# Patient Record
Sex: MALE | Race: WHITE | NOT HISPANIC OR LATINO | Employment: STUDENT | URBAN - METROPOLITAN AREA
[De-identification: names, ages, dates, MRNs, and addresses within clinical notes are randomized per-mention and may not be internally consistent; named-entity substitution may affect disease eponyms.]

---
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
## 2021-03-19 ENCOUNTER — TELEPHONE (OUTPATIENT)
Dept: PEDIATRICS CLINIC | Age: 21
End: 2021-03-19

## 2021-03-19 DIAGNOSIS — Z20.822 CLOSE EXPOSURE TO COVID-19 VIRUS: Primary | ICD-10-CM

## 2021-03-28 ENCOUNTER — IMMUNIZATIONS (OUTPATIENT)
Dept: FAMILY MEDICINE CLINIC | Facility: HOSPITAL | Age: 21
End: 2021-03-28

## 2021-03-28 DIAGNOSIS — Z23 ENCOUNTER FOR IMMUNIZATION: Primary | ICD-10-CM

## 2021-03-28 PROCEDURE — 91300 SARS-COV-2 / COVID-19 MRNA VACCINE (PFIZER-BIONTECH) 30 MCG: CPT

## 2021-03-28 PROCEDURE — 0001A SARS-COV-2 / COVID-19 MRNA VACCINE (PFIZER-BIONTECH) 30 MCG: CPT

## 2021-04-10 ENCOUNTER — TELEPHONE (OUTPATIENT)
Dept: OTHER | Facility: OTHER | Age: 21
End: 2021-04-10

## 2021-04-27 ENCOUNTER — IMMUNIZATIONS (OUTPATIENT)
Dept: FAMILY MEDICINE CLINIC | Facility: HOSPITAL | Age: 21
End: 2021-04-27

## 2021-04-27 DIAGNOSIS — Z23 ENCOUNTER FOR IMMUNIZATION: Primary | ICD-10-CM

## 2021-04-27 PROCEDURE — 0002A SARS-COV-2 / COVID-19 MRNA VACCINE (PFIZER-BIONTECH) 30 MCG: CPT

## 2021-04-27 PROCEDURE — 91300 SARS-COV-2 / COVID-19 MRNA VACCINE (PFIZER-BIONTECH) 30 MCG: CPT

## 2021-08-13 ENCOUNTER — TELEPHONE (OUTPATIENT)
Dept: FAMILY MEDICINE CLINIC | Facility: CLINIC | Age: 21
End: 2021-08-13

## 2021-08-13 NOTE — TELEPHONE ENCOUNTER
Attempted to reach patient again, when he calls back, please offer a later appt time     Miya Hou MA

## 2021-08-13 NOTE — TELEPHONE ENCOUNTER
Called patient to change appt time as Vernadine Mustache will not be here that morning  LMOM for a call back     Jerry Arellano MA

## 2021-08-16 ENCOUNTER — OFFICE VISIT (OUTPATIENT)
Dept: FAMILY MEDICINE CLINIC | Facility: CLINIC | Age: 21
End: 2021-08-16
Payer: COMMERCIAL

## 2021-08-16 VITALS
OXYGEN SATURATION: 98 % | HEART RATE: 70 BPM | WEIGHT: 147 LBS | BODY MASS INDEX: 21.77 KG/M2 | SYSTOLIC BLOOD PRESSURE: 100 MMHG | HEIGHT: 69 IN | DIASTOLIC BLOOD PRESSURE: 60 MMHG | RESPIRATION RATE: 20 BRPM | TEMPERATURE: 96.6 F

## 2021-08-16 DIAGNOSIS — Z13.0 SCREENING FOR DEFICIENCY ANEMIA: ICD-10-CM

## 2021-08-16 DIAGNOSIS — R19.4 CHANGE IN BOWEL HABITS: ICD-10-CM

## 2021-08-16 DIAGNOSIS — Z23 NEED FOR VACCINATION: ICD-10-CM

## 2021-08-16 DIAGNOSIS — Z13.1 SCREENING FOR DIABETES MELLITUS: ICD-10-CM

## 2021-08-16 DIAGNOSIS — Z11.4 SCREENING FOR HIV (HUMAN IMMUNODEFICIENCY VIRUS): ICD-10-CM

## 2021-08-16 DIAGNOSIS — Z13.6 SCREENING FOR CARDIOVASCULAR CONDITION: ICD-10-CM

## 2021-08-16 DIAGNOSIS — Z00.00 ROUTINE ADULT HEALTH MAINTENANCE: Primary | ICD-10-CM

## 2021-08-16 DIAGNOSIS — Z11.59 NEED FOR HEPATITIS C SCREENING TEST: ICD-10-CM

## 2021-08-16 PROCEDURE — 1036F TOBACCO NON-USER: CPT | Performed by: NURSE PRACTITIONER

## 2021-08-16 PROCEDURE — 99385 PREV VISIT NEW AGE 18-39: CPT | Performed by: NURSE PRACTITIONER

## 2021-08-16 PROCEDURE — 90715 TDAP VACCINE 7 YRS/> IM: CPT

## 2021-08-16 PROCEDURE — 90471 IMMUNIZATION ADMIN: CPT

## 2021-08-16 PROCEDURE — 3725F SCREEN DEPRESSION PERFORMED: CPT | Performed by: NURSE PRACTITIONER

## 2021-08-16 PROCEDURE — 3008F BODY MASS INDEX DOCD: CPT | Performed by: NURSE PRACTITIONER

## 2021-08-16 NOTE — PATIENT INSTRUCTIONS
Wellness Visit for Adults   WHAT YOU NEED TO KNOW:   What is a wellness visit? A wellness visit is when you see your healthcare provider to get screened for health problems  Your healthcare provider will also give you advice on how to stay healthy  Write down your questions so you remember to ask them  Ask your healthcare provider how often you should have a wellness visit  What happens at a wellness visit? Your healthcare provider will ask about your health, and your family history of health problems  This includes high blood pressure, heart disease, and cancer  He or she will ask if you have symptoms that concern you, if you smoke, and about your mood  You may also be asked about your intake of medicines, supplements, food, and alcohol  Any of the following may be done:  · Your weight  will be checked  Your height may also be checked so your body mass index (BMI) can be calculated  Your BMI shows if you are at a healthy weight  · Your blood pressure  and heart rate will be checked  Your temperature may also be checked  · Blood and urine tests  may be done  Blood tests may be done to check your cholesterol levels  Abnormal cholesterol levels increase your risk for heart disease and stroke  You may also need a blood or urine test to check for diabetes if you are at increased risk  Urine tests may be done to look for signs of an infection or kidney disease  · A physical exam  includes checking your heartbeat and lungs with a stethoscope  Your healthcare provider may also check your skin to look for sun damage  · Screening tests  may be recommended  A screening test is done to check for diseases that may not cause symptoms  The screening tests you may need depend on your age, gender, family history, and lifestyle habits  For example, colorectal screening may be recommended if you are 48years old or older  What screening tests do I need if I am a woman?    · A Pap smear  is used to screen for cervical cancer  Pap smears are usually done every 3 to 5 years depending on your age  You may need them more often if you have had abnormal Pap smear test results in the past  Ask your healthcare provider how often you should have a Pap smear  · A mammogram  is an x-ray of your breasts to screen for breast cancer  Experts recommend mammograms every 2 years starting at age 48 years  You may need a mammogram at age 52 years or younger if you have an increased risk for breast cancer  Talk to your healthcare provider about when you should start having mammograms and how often you need them  What vaccines might I need? · Get an influenza vaccine  every year  The influenza vaccine protects you from the flu  Several types of viruses cause the flu  The viruses change over time, so new vaccines are made each year  · Get a tetanus-diphtheria (Td) booster vaccine  every 10 years  This vaccine protects you against tetanus and diphtheria  Tetanus is a severe infection that may cause painful muscle spasms and lockjaw  Diphtheria is a severe bacterial infection that causes a thick covering in the back of your mouth and throat  · Get a human papillomavirus (HPV) vaccine  if you are female and aged 23 to 32 or male 23 to 24 and never received it  This vaccine protects you from HPV infection  HPV is the most common infection spread by sexual contact  HPV may also cause vaginal, penile, and anal cancers  · Get a pneumococcal vaccine  if you are aged 72 years or older  The pneumococcal vaccine is an injection given to protect you from pneumococcal disease  Pneumococcal disease is an infection caused by pneumococcal bacteria  The infection may cause pneumonia, meningitis, or an ear infection  · Get a shingles vaccine  if you are 60 or older, even if you have had shingles before  The shingles vaccine is an injection to protect you from the varicella-zoster virus  This is the same virus that causes chickenpox   Shingles is a painful rash that develops in people who had chickenpox or have been exposed to the virus  How can I eat healthy? My Plate is a model for planning healthy meals  It shows the types and amounts of foods that should go on your plate  Fruits and vegetables make up about half of your plate, and grains and protein make up the other half  A serving of dairy is included on the side of your plate  The amount of calories and serving sizes you need depends on your age, gender, weight, and height  Examples of healthy foods are listed below:  · Eat a variety of vegetables  such as dark green, red, and orange vegetables  You can also include canned vegetables low in sodium (salt) and frozen vegetables without added butter or sauces  · Eat a variety of fresh fruits , canned fruit in 100% juice, frozen fruit, and dried fruit  · Include whole grains  At least half of the grains you eat should be whole grains  Examples include whole-wheat bread, wheat pasta, brown rice, and whole-grain cereals such as oatmeal     · Eat a variety of protein foods such as seafood (fish and shellfish), lean meat, and poultry without skin (turkey and chicken)  Examples of lean meats include pork leg, shoulder, or tenderloin, and beef round, sirloin, tenderloin, and extra lean ground beef  Other protein foods include eggs and egg substitutes, beans, peas, soy products, nuts, and seeds  · Choose low-fat dairy products such as skim or 1% milk or low-fat yogurt, cheese, and cottage cheese  · Limit unhealthy fats  such as butter, hard margarine, and shortening  How much exercise do I need? Exercise at least 30 minutes per day on most days of the week  Some examples of exercise include walking, biking, dancing, and swimming  You can also fit in more physical activity by taking the stairs instead of the elevator or parking farther away from stores  Include muscle strengthening activities 2 days each week   Regular exercise provides many health benefits  It helps you manage your weight, and decreases your risk for type 2 diabetes, heart disease, stroke, and high blood pressure  Exercise can also help improve your mood  Ask your healthcare provider about the best exercise plan for you  What are some general health and safety guidelines I should follow? · Do not smoke  Nicotine and other chemicals in cigarettes and cigars can cause lung damage  Ask your healthcare provider for information if you currently smoke and need help to quit  E-cigarettes or smokeless tobacco still contain nicotine  Talk to your healthcare provider before you use these products  · Limit alcohol  A drink of alcohol is 12 ounces of beer, 5 ounces of wine, or 1½ ounces of liquor  · Lose weight, if needed  Being overweight increases your risk of certain health conditions  These include heart disease, high blood pressure, type 2 diabetes, and certain types of cancer  · Protect your skin  Do not sunbathe or use tanning beds  Use sunscreen with a SPF 15 or higher  Apply sunscreen at least 15 minutes before you go outside  Reapply sunscreen every 2 hours  Wear protective clothing, hats, and sunglasses when you are outside  · Drive safely  Always wear your seatbelt  Make sure everyone in your car wears a seatbelt  A seatbelt can save your life if you are in an accident  Do not use your cell phone when you are driving  This could distract you and cause an accident  Pull over if you need to make a call or send a text message  · Practice safe sex  Use latex condoms if are sexually active and have more than one partner  Your healthcare provider may recommend screening tests for sexually transmitted infections (STIs)  · Wear helmets, lifejackets, and protective gear  Always wear a helmet when you ride a bike or motorcycle, go skiing, or play sports that could cause a head injury  Wear protective equipment when you play sports   Wear a lifejacket when you are on a boat or doing water sports  CARE AGREEMENT:   You have the right to help plan your care  Learn about your health condition and how it may be treated  Discuss treatment options with your healthcare providers to decide what care you want to receive  You always have the right to refuse treatment  The above information is an  only  It is not intended as medical advice for individual conditions or treatments  Talk to your doctor, nurse or pharmacist before following any medical regimen to see if it is safe and effective for you  © Copyright [employer] 2021 Information is for End User's use only and may not be sold, redistributed or otherwise used for commercial purposes   All illustrations and images included in CareNotes® are the copyrighted property of [employer]  or [address] [place] [address]

## 2021-08-16 NOTE — PROGRESS NOTES
FAMILY PRACTICE HEALTH MAINTENANCE OFFICE VISIT  [facility] - [facility]    NAME: Solomon Woodson  AGE: 24 y o  SEX: male  : 2000     DATE: 2021    Assessment and Plan     1  Routine adult health maintenance    2  Need for vaccination  -     TDAP VACCINE GREATER THAN OR EQUAL TO 6YO IM    3  Need for hepatitis C screening test  -     Hepatitis C antibody; Future  -     Hepatitis C antibody    4  Screening for HIV (human immunodeficiency virus)  -     [facility]- Human Immunodeficiency Virus 1/2 Antigen / Antibody ( Fourth Generation) with Reflex Testing; Future    5  Screening for cardiovascular condition  -     Lipid Panel with Direct LDL reflex; Future  -     Lipid Panel with Direct LDL reflex    6  Screening for diabetes mellitus  -     Comprehensive metabolic panel; Future  -     Comprehensive metabolic panel    7  Screening for deficiency anemia  -     CBC; Future  -     CBC    8  Change in bowel habits  -     Ambulatory referral to Gastroenterology; Future  -     Celiac Disease Antibody Profile; Future  -     Celiac Disease Antibody Profile        · Patient Counseling:   · Nutrition: Stressed importance of a well balanced diet, moderation of sodium/saturated fat, caloric balance and sufficient intake of fiber  · Exercise: Stressed the importance of regular exercise with a goal of 150 minutes per week  · Dental Health: Discussed daily flossing and brushing and regular dental visits   · Sexuality: Discussed sexually transmitted infections, use of condoms and prevention of unintended pregnancy  · Alcohol Use:  Recommended moderation of alcohol intake  · Injury Prevention: Discussed Safety Belts, Safety Helmets, and Smoke Detectors    · Immunizations reviewed: See Orders  · Discussed benefits of:  Screening labs  BMI Counseling: Body mass index is 21 71 kg/m²  Discussed with patient's BMI with him    Return in about 1 year (around 2022) for Annual physical         Chief Complaint     Chief Complaint   Patient presents with    Physical Exam     [facility] Patient Visit       History of Present Illness     HPI  New to practice  Personal and family medical history reviewed  Well Adult Physical   Patient here for a comprehensive physical exam   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 but will be living outside campus in apartment  Had h/o asthma but have not needed any inhaler from last 4 years  Stated that from a month having loose stools on and off but no rectal bleeding and weight loss and getting better  Denies any family h/o colon cancer, IBS and Inflammatory bowel disease  Will check for celiac and informed that if not improving then should follow with gastro and referral provided  Stated that lost 6 pounds since nov 2020 and it is intentional  Stated that from couple of months, sometimes had hard time seeing in night and will follow with ophthalmologist soon and had h/o lazy eye  Accepted HIV and Hep C screening  Paternal grandfather had prostate cancer in later age    Diet and Physical Activity  Diet: well balanced diet  Exercise: frequently      Depression Screen  PHQ-9 Depression Screening    PHQ-9:   Frequency of the following problems over the past two weeks:      Little interest or pleasure in doing things: 0 - not at all  Feeling down, depressed, or hopeless: 0 - not at all  PHQ-2 Score: 0          General Health  Hearing: Normal:  bilateral  Vision: most recent eye exam >1 year  Dental: regular dental visits    Reproductive Health  Denies nocturia and Monthly self testicular exams recommended      The following portions of the patient's history were reviewed and updated as appropriate: allergies, current medications, past family history, past medical history, past social history, past surgical history and problem list     Review of Systems     Review of Systems   Constitutional: Negative  HENT: Negative      Eyes: Negative  Respiratory: Negative  Cardiovascular: Negative  Gastrointestinal: Negative for abdominal distention, abdominal pain, anal bleeding, blood in stool, constipation, nausea, rectal pain and vomiting  As noted in HPI'     Endocrine: Negative  Genitourinary: Negative  Musculoskeletal: Negative  Skin: Negative  Allergic/Immunologic: Negative  Neurological: Negative  Hematological: Negative  Psychiatric/Behavioral: Negative  Past Medical History     Past Medical History:   Diagnosis Date    Acne     Asthma     Asthma 8/12/2013    Difficulty walking     last assessed 6/30/2014    Migraine     last assessed 11/20/2013       Past Surgical History     Past Surgical History:   Procedure Laterality Date    CIRCUMCISION      WISDOM TOOTH EXTRACTION         Social History     Social History     Socioeconomic History    Marital status: Single     Spouse name: None    Number of children: None    Years of education: 8    Highest education level: None   Occupational History    None   Tobacco Use    Smoking status: Never Smoker    Smokeless tobacco: Never Used   Vaping Use    Vaping Use: Never used   Substance and Sexual Activity    Alcohol use: No    Drug use: No    Sexual activity: Yes     Partners: Female   Other Topics Concern    None   Social History Narrative    Immunization status: up to date and documented  12th grade     Pets in caregiver's home    Hx of sports activities:  Field hockey    Track and field cross country     Social Determinants of Health     Financial Resource Strain:     Difficulty of Paying Living Expenses:    Food Insecurity:     Worried About Running Out of Food in the Last Year:     [address] in the Last Year:    Transportation Needs:     Lack of Transportation (Medical):      Lack of Transportation (Non-Medical):    Physical Activity:     Days of Exercise per Week:     Minutes of Exercise per Session:    Stress:     Feeling of Stress :    Social Connections:     Frequency of Communication with Friends and Family:     Frequency of Social Gatherings with Friends and Family:     Attends Sikh Services:     Active Member of Clubs or Organizations:     Attends Club or Organization Meetings:     Marital Status:    Intimate Partner Violence:     Fear of Current or Ex-Partner:     Emotionally Abused:     Physically Abused:     Sexually Abused:        Family History     Family History   Problem Relation Age of Onset    Interstitial cystitis Mother         chronic    Other Mother         kidney stones    Hypothyroidism Mother    [facility] Migraines Mother     Psoriasis Mother     Other Father         calculus of kidney and ureter    Atrial fibrillation Maternal Grandmother     Hypertension Maternal Grandmother     Other Paternal Grandfather         calculus of kidney and ureter    Cancer Paternal Grandfather         prostate cancer       Current Medications     No current outpatient medications on file  Allergies     No Known Allergies    Objective     /60   Pulse 70   Temp (!) 96 6 °F (35 9 °C)   Resp 20   Ht 5' 9" (1 753 m)   Wt 66 7 kg (147 lb)   SpO2 98%   BMI 21 71 kg/m²      Physical Exam  Vitals reviewed  Constitutional:       Appearance: Normal appearance  He is well-developed  HENT:      Head: Normocephalic  Right Ear: Tympanic membrane, ear canal and external ear normal       Left Ear: Tympanic membrane, ear canal and external ear normal       Nose: Nose normal       Right Sinus: No maxillary sinus tenderness or frontal sinus tenderness  Left Sinus: No maxillary sinus tenderness or frontal sinus tenderness  Mouth/Throat:      Lips: Pink  Mouth: Mucous membranes are moist       Dentition: Normal dentition  No gingival swelling  Pharynx: No pharyngeal swelling, oropharyngeal exudate or posterior oropharyngeal erythema        Comments: Patient has bilateral enlarged tonsils chronically  Eyes:      General: Lids are normal       Conjunctiva/sclera: Conjunctivae normal       Pupils: Pupils are equal, round, and reactive to light  Neck:      Thyroid: No thyromegaly  Vascular: No carotid bruit  Cardiovascular:      Rate and Rhythm: Normal rate and regular rhythm  Pulses:           Radial pulses are 2+ on the right side and 2+ on the left side  Femoral pulses are 2+ on the right side and 2+ on the left side  Dorsalis pedis pulses are 2+ on the right side and 2+ on the left side  Posterior tibial pulses are 2+ on the right side and 2+ on the left side  Heart sounds: Normal heart sounds  No murmur heard  Pulmonary:      Effort: Pulmonary effort is normal       Breath sounds: Normal breath sounds  Chest:      Chest wall: No mass, lacerations, deformity, swelling, tenderness, crepitus or edema  There is no dullness to percussion  Breasts:         Right: No swelling, bleeding, mass, nipple discharge, skin change or tenderness  Left: No swelling, bleeding, mass, nipple discharge, skin change or tenderness  Abdominal:      General: Bowel sounds are normal       Palpations: Abdomen is soft  There is no hepatomegaly  Tenderness: There is no abdominal tenderness  There is no rebound  Hernia: No hernia is present  There is no hernia in the umbilical area, ventral area, left inguinal area or right inguinal area  Musculoskeletal:         General: No tenderness or deformity  Normal range of motion  Cervical back: Full passive range of motion without pain, normal range of motion and neck supple  Lymphadenopathy:      Cervical:      Right cervical: No superficial or posterior cervical adenopathy  Left cervical: No superficial or posterior cervical adenopathy  Upper Body:      Right upper body: No supraclavicular or axillary adenopathy  Left upper body: No supraclavicular or axillary adenopathy        Lower Body: No right inguinal adenopathy  No left inguinal adenopathy  Skin:     General: Skin is warm and dry  Neurological:      Mental Status: He is alert and oriented to person, place, and time  GCS: GCS eye subscore is 4  GCS verbal subscore is 5  GCS motor subscore is 6  Sensory: No sensory deficit  Coordination: Coordination normal       Gait: Gait normal       Deep Tendon Reflexes: Reflexes are normal and symmetric  Psychiatric:         Speech: Speech normal          Behavior: Behavior normal          Thought Content:  Thought content normal          Judgment: Judgment normal             Visual Acuity Screening    Right eye Left eye Both eyes   Without correction: 20/20 20/20 20/20   With correction:              Radha Bell, [address] [place] [address]

## 2021-08-18 LAB
ALBUMIN SERPL-MCNC: 4.5 G/DL (ref 4.1–5.2)
ALBUMIN/GLOB SERPL: 1.7 {RATIO} (ref 1.2–2.2)
ALP SERPL-CCNC: 50 IU/L (ref 55–125)
ALT SERPL-CCNC: 16 IU/L (ref 0–44)
AST SERPL-CCNC: 21 IU/L (ref 0–40)
BILIRUB SERPL-MCNC: 1.6 MG/DL (ref 0–1.2)
BUN SERPL-MCNC: 14 MG/DL (ref 6–20)
BUN/CREAT SERPL: 19 (ref 9–20)
CALCIUM SERPL-MCNC: 9.3 MG/DL (ref 8.7–10.2)
CHLORIDE SERPL-SCNC: 100 MMOL/L (ref 96–106)
CHOLEST SERPL-MCNC: 144 MG/DL (ref 100–169)
CO2 SERPL-SCNC: 25 MMOL/L (ref 20–29)
CREAT SERPL-MCNC: 0.73 MG/DL (ref 0.76–1.27)
ENDOMYSIUM IGA SER QL: NEGATIVE
ERYTHROCYTE [DISTWIDTH] IN BLOOD BY AUTOMATED COUNT: 12.3 % (ref 11.6–15.4)
GLIADIN PEPTIDE IGA SER-ACNC: 3 UNITS (ref 0–19)
GLIADIN PEPTIDE IGG SER-ACNC: 2 UNITS (ref 0–19)
GLOBULIN SER-MCNC: 2.6 G/DL (ref 1.5–4.5)
GLUCOSE SERPL-MCNC: 88 MG/DL (ref 65–99)
HCT VFR BLD AUTO: 44.9 % (ref 37.5–51)
HCV AB S/CO SERPL IA: <0.1 S/CO RATIO (ref 0–0.9)
HDLC SERPL-MCNC: 50 MG/DL
HGB BLD-MCNC: 15.6 G/DL (ref 13–17.7)
HIV 1+2 AB+HIV1 P24 AG SERPL QL IA: NON REACTIVE
IGA SERPL-MCNC: 304 MG/DL (ref 90–386)
LDLC SERPL CALC-MCNC: 79 MG/DL (ref 0–109)
LDLC/HDLC SERPL: 1.6 RATIO (ref 0–3.6)
MCH RBC QN AUTO: 31.5 PG (ref 26.6–33)
MCHC RBC AUTO-ENTMCNC: 34.7 G/DL (ref 31.5–35.7)
MCV RBC AUTO: 91 FL (ref 79–97)
MICRODELETION SYND BLD/T FISH: NORMAL
PLATELET # BLD AUTO: 184 X10E3/UL (ref 150–450)
POTASSIUM SERPL-SCNC: 4.5 MMOL/L (ref 3.5–5.2)
PROT SERPL-MCNC: 7.1 G/DL (ref 6–8.5)
RBC # BLD AUTO: 4.95 X10E6/UL (ref 4.14–5.8)
SL AMB EGFR AFRICAN AMERICAN: 155 ML/MIN/1.73
SL AMB EGFR NON AFRICAN AMERICAN: 134 ML/MIN/1.73
SL AMB VLDL CHOLESTEROL CALC: 15 MG/DL (ref 5–40)
SODIUM SERPL-SCNC: 138 MMOL/L (ref 134–144)
TRIGL SERPL-MCNC: 80 MG/DL (ref 0–89)
TTG IGA SER-ACNC: <2 U/ML (ref 0–3)
TTG IGG SER-ACNC: 9 U/ML (ref 0–5)
WBC # BLD AUTO: 5 X10E3/UL (ref 3.4–10.8)

## 2022-02-05 ENCOUNTER — TELEMEDICINE (OUTPATIENT)
Dept: FAMILY MEDICINE CLINIC | Facility: CLINIC | Age: 22
End: 2022-02-05
Payer: COMMERCIAL

## 2022-02-05 DIAGNOSIS — H10.33 ACUTE BACTERIAL CONJUNCTIVITIS OF BOTH EYES: Primary | ICD-10-CM

## 2022-02-05 PROCEDURE — 99213 OFFICE O/P EST LOW 20 MIN: CPT | Performed by: FAMILY MEDICINE

## 2022-02-05 PROCEDURE — 1036F TOBACCO NON-USER: CPT | Performed by: FAMILY MEDICINE

## 2022-02-05 RX ORDER — MOXIFLOXACIN 5 MG/ML
1 SOLUTION/ DROPS OPHTHALMIC 3 TIMES DAILY
Qty: 3 ML | Refills: 0 | Status: SHIPPED | OUTPATIENT
Start: 2022-02-05 | End: 2022-02-12

## 2022-02-05 NOTE — PROGRESS NOTES
Virtual Regular Visit    Verification of patient location:    Patient is located in the following state in which I hold an active license [place]      Assessment/Plan:    Problem List Items Addressed This Visit     None      Visit Diagnoses     Acute bacterial conjunctivitis of both eyes    -  Primary    Relevant Medications    moxifloxacin (VIGAMOX) 0 5 % ophthalmic solution               Reason for visit is   Chief Complaint   Patient presents with    Virtual Regular Visit        Encounter provider Parish Eaton MD    Provider located at  O  [address] [id number]      Recent Visits  No visits were found meeting these conditions  Showing recent visits within past 7 days and meeting all other requirements  Today's Visits  Date Type Provider Dept   02/05/22 Telemedicine Parish Eaton, [facility] today's visits and meeting all other requirements  Future Appointments  No visits were found meeting these conditions  Showing future appointments within next 150 days and meeting all other requirements       The patient was identified by name and date of birth  Allison Lancaster was informed that this is a telemedicine visit and that the visit is being conducted through [facility] and patient was informed this is a secure, HIPAA-complaint platform  He agrees to proceed     My office door was closed  No one else was in the room  He acknowledged consent and understanding of privacy and security of the video platform  The patient has agreed to participate and understands they can discontinue the visit at any time  Patient is aware this is a billable service  Subjective  Allison Lancaster is a 22 y o  male    HPI   He reports 1 day history of redness in both his eyes, left worse than the right  There is purulent discharge in both eyes  Had difficulty opening his eyes this morning due to discharge   Denies fevers, chills, vision problem, eye pain, itching  Has had a cold over the past few days  Past Medical History:   Diagnosis Date    Acne     Asthma     Asthma 8/12/2013    Difficulty walking     last assessed 6/30/2014    Migraine     last assessed 11/20/2013       Past Surgical History:   Procedure Laterality Date    CIRCUMCISION      WISDOM TOOTH EXTRACTION         Current Outpatient Medications   Medication Sig Dispense Refill    moxifloxacin (VIGAMOX) 0 5 % ophthalmic solution Administer 1 drop to both eyes 3 (three) times a day for 7 days 3 mL 0     No current facility-administered medications for this visit  No Known Allergies    Review of Systems   Constitutional: Negative  HENT: Negative  Eyes: Positive for discharge and redness  Respiratory: Negative  Cardiovascular: Negative  Gastrointestinal: Negative  Endocrine: Negative  Genitourinary: Negative  Musculoskeletal: Negative  Neurological: Negative  Hematological: Negative  Psychiatric/Behavioral: Negative  Video Exam    There were no vitals filed for this visit  Physical Exam  Constitutional:       General: He is not in acute distress  Appearance: He is well-developed  He is not diaphoretic  HENT:      Head: Normocephalic and atraumatic  Eyes:      General: No scleral icterus  Right eye: Discharge present  Left eye: Discharge present  Conjunctiva/sclera:      Right eye: Right conjunctiva is injected  Left eye: Left conjunctiva is injected  Pulmonary:      Effort: Pulmonary effort is normal    Musculoskeletal:      Cervical back: Normal range of motion  Skin:     General: Skin is warm  Neurological:      Mental Status: He is alert and oriented to person, place, and time  Psychiatric:         Behavior: Behavior normal          Thought Content:  Thought content normal          Judgment: Judgment normal           I spent 15 minutes directly with the patient during this visit    VIRTUAL VISIT DISCLAIMER      Solomon Woodson verbally agrees to participate in [facility]  Pt is aware that [facility] could be limited without vital signs or the ability to perform a full hands-on physical Dea Navid understands he or the provider may request at any time to terminate the video visit and request the patient to seek care or treatment in person

## 2023-05-11 ENCOUNTER — TELEPHONE (OUTPATIENT)
Dept: FAMILY MEDICINE CLINIC | Facility: CLINIC | Age: 23
End: 2023-05-11

## 2023-05-22 ENCOUNTER — OFFICE VISIT (OUTPATIENT)
Dept: FAMILY MEDICINE CLINIC | Facility: CLINIC | Age: 23
End: 2023-05-22

## 2023-05-22 VITALS
HEART RATE: 60 BPM | BODY MASS INDEX: 24.14 KG/M2 | TEMPERATURE: 97.9 F | WEIGHT: 163 LBS | DIASTOLIC BLOOD PRESSURE: 78 MMHG | SYSTOLIC BLOOD PRESSURE: 114 MMHG | RESPIRATION RATE: 16 BRPM | HEIGHT: 69 IN

## 2023-05-22 DIAGNOSIS — R22.2 LUMP OF SKIN OF BACK: Primary | ICD-10-CM

## 2023-05-22 NOTE — PROGRESS NOTES
"Assessment/Plan:  Discussed that cyst or lipoma in differential and will follow with surgeon for possible excision  1  Lump of skin of back  -     Ambulatory Referral to General Surgery; Future          Patient Instructions:  Supportive care discussed and advised  Advised to RTO for any worsening and no improvement  Follow up for no improvement and worsening of conditions  Patient advised and educated when to see immediate medical care  Return if symptoms worsen or fail to improve  No future appointments  Subjective:      Patient ID: Beulah Mojica is a 25 y o  male  Chief Complaint   Patient presents with   • Skin Lesion     States that he has a lesion on his upper back to be evaluated, noted in January [id number]         Vitals:  /78   Pulse 60   Temp 97 9 °F (36 6 °C)   Resp 16   Ht 5' 9\" (1 753 m)   Wt 73 9 kg (163 lb)   BMI 24 07 kg/m²     HPI  Patient stated that noticed lump on his upper back in January 2023 and stated that it has decreased in size  Denies any pain at the area and denies any back pain and movement issues  PHQ-2/9 Depression Screening    Little interest or pleasure in doing things: 0 - not at all  Feeling down, depressed, or hopeless: 0 - not at all  PHQ-2 Score: 0  PHQ-2 Interpretation: Negative depression screen             The following portions of the patient's history were reviewed and updated as appropriate: allergies, current medications, past family history, past medical history, past social history, past surgical history and problem list       Review of Systems   HENT: Negative  Respiratory: Negative  Cardiovascular: Negative  Musculoskeletal: Negative  Skin:        As noted in HPI     Neurological: Negative  Objective:    Social History     Tobacco Use   Smoking Status Never   Smokeless Tobacco Never       Allergies: No Known Allergies      No current outpatient medications on file       No current facility-administered " medications for this visit  Physical Exam  Constitutional:       Appearance: Normal appearance  HENT:      Head: Normocephalic  Nose: Nose normal    Eyes:      Conjunctiva/sclera: Conjunctivae normal    Cardiovascular:      Rate and Rhythm: Normal rate and regular rhythm  Heart sounds: Normal heart sounds  Pulmonary:      Effort: Pulmonary effort is normal       Breath sounds: Normal breath sounds  Musculoskeletal:         General: No swelling or tenderness  Normal range of motion  Skin:     General: Skin is warm and dry  Findings: No rash  Neurological:      Mental Status: He is alert and oriented to person, place, and time  Psychiatric:         Mood and Affect: Mood normal          Behavior: Behavior normal          Thought Content:  Thought content normal          Judgment: Judgment normal                      CARLOS Sharma

## 2023-05-25 NOTE — PROGRESS NOTES
Assessment/Plan:   Zenia Gambino is a 25 y o male who is here for   Chief Complaint   Patient presents with   • Lipoma     Upper middle back  He had it since Jan 2023  PCP told him to come asap because it is by his spine  On exam found to have Sebaceous Cyst of the : upper back  Plan: Excise lesion(s) in the office under local anesthetic    Positioning: lateral, right side up    Post Op Pain Management:   Motrin and Tylenol    - Patient has been instructed to avoid herbs or non-directed vitamins the week prior to surgery to ensure no drug interactions with perioperative surgical and anesthetic medications  - Patient should continue beta-blocker medication up through and including the day of surgery but hold any other hypertensive medications, including diuretics, unless instructed by PCP or anesthesia  - Patient should continue his statin medication up through and including the day of surgery   - Hold metformin , If on this medication, the morning of surgery and do not resume until 48 hours AFTER surgery to avoid risk of lactic acidosis  Do not resume if eGFR is < 30  - Insulin Management:If on Insulin, patient advised to call PCP for explicit instructions  In general, will need to take one-half normal dose am of surgery but pt advised to consult PCP before making any changes  - Patient has been instructed to avoid aspirin containing medications or non-steroidal anti-inflammatory drugs for SEVEN days preceding surgery  Preoperative Clearance: None          _______________________________________________________  CC:Lipoma (Upper middle back  He had it since Jan 2023  PCP told him to come asap because it is by his spine  )    HPI:  Zenia Gambino is a 25 y o male who was referred for evaluation of Lipoma (Upper middle back  He had it since Jan 2023  PCP told him to come asap because it is by his spine  )        Currently patient reports he has had a lump on his back since January - it was larger but has since gotten smaller  No pain, fevers, discharge  He has never had it removed before  Upper back  ROS:  General ROS: negative  negative for - chills, fatigue, fever or night sweats, weight loss  Respiratory ROS: no cough, shortness of breath, or wheezing  Cardiovascular ROS: no chest pain or dyspnea on exertion  Genito-Urinary ROS: no dysuria, trouble voiding, or hematuria  Musculoskeletal ROS: negative for - gait disturbance, joint pain or muscle pain  Neurological ROS: no TIA or stroke symptoms  Skin ROS: See HPI  GI ROS: see HPI  Skin ROS: no new rashes or lesions   Lymphatic ROS: no new adenopathy noted by pt  Psy ROS: no new mental or behavioral disturbances       Patient Active Problem List   Diagnosis   • Acquired deformity of foot   • Pain in both feet   • Congenital pes planus of right foot   • Congenital pes planus of left foot   • Tarsal coalitions   • Asthma   • Pectus excavatum   • Patellofemoral pain syndrome of left knee   • BMI 22 0-22 9, adult   • Negative depression screening         Allergies:  Patient has no known allergies  No current outpatient medications on file  Past Medical History:   Diagnosis Date   • Acne    • Asthma    • Asthma 8/12/2013   • Difficulty walking     last assessed 6/30/2014   • Migraine     last assessed 11/20/2013       Past Surgical History:   Procedure Laterality Date   • CIRCUMCISION     • WISDOM TOOTH EXTRACTION         Family History   Problem Relation Age of Onset   • Interstitial cystitis Mother         chronic   • Other Mother         kidney stones   • Hypothyroidism Mother    • Migraines Mother    • Psoriasis Mother    • Other Father         calculus of kidney and ureter   • Atrial fibrillation Maternal Grandmother    • Hypertension Maternal Grandmother    • Other Paternal Grandfather         calculus of kidney and ureter   • Cancer Paternal Grandfather         prostate cancer        reports that he has never smoked   He has never used smokeless tobacco  He reports current alcohol use  He reports that he does not use drugs  Vitals:    05/30/23 1031   BP: 130/86   Pulse: 76   Temp: 98 °F (36 7 °C)        PHYSICAL EXAM  General Appearance:    Alert, cooperative, no distress,    Head:    Normocephalic without obvious abnormality   Eyes:    PERRL, conjunctiva/corneas clear     Neck:    Back:      Lungs:      Heart:     Abdomen:     Benign, no rebound or guarding  Extremities:   Extremities normal  No clubbing, cyanosis or edema   Psych:   Normal Affect, AOx3  Neurologic:  Skin:   CNII-XII intact  Strength symmetric, speech intact    Warm, dry, intact, no visible rashes or lesions except as follows: 1 5 mobile mass on the upper just left to midline back  No erythema, no fluctuation, no tenderness                    Jose Cruz ManuelanHUGO    Date: 5/30/2023 Time: 11:03 AM

## 2023-05-30 ENCOUNTER — CONSULT (OUTPATIENT)
Dept: SURGERY | Facility: CLINIC | Age: 23
End: 2023-05-30

## 2023-05-30 VITALS
SYSTOLIC BLOOD PRESSURE: 130 MMHG | BODY MASS INDEX: 24.14 KG/M2 | DIASTOLIC BLOOD PRESSURE: 86 MMHG | WEIGHT: 163 LBS | HEART RATE: 76 BPM | TEMPERATURE: 98 F | HEIGHT: 69 IN

## 2023-05-30 DIAGNOSIS — R22.2 LUMP OF SKIN OF BACK: ICD-10-CM

## 2023-05-30 PROCEDURE — 88304 TISSUE EXAM BY PATHOLOGIST: CPT | Performed by: PATHOLOGY

## 2023-05-30 NOTE — PROGRESS NOTES
Solomon Mastromonaco      SITE: upper back    The area of concern as listed above was identified and marked  Confirmation of the patient's allergies was carried out  Patient was not allergic to local anesthesia  Written and verbal consent were obtained  A full time-out was carried out for this procedure  The area was prepped and draped in a sterile fashion  Anesthesia:  Local anesthesia:  Lidocaine: 1%   with Epinephrine was used  Approximately:   4 cc were used  Using: scapel, the area of concern was excised  Size of lesion:1 cm     Margins:2 mm  The wound was irrigated with sterile saline  Skin, soft tissue/subcutaneous tissue were removed along with the lesion  Closure: Monocryl 4-0     The wound was dressed with: skin glue      Pathology sent: yes    The patient tolerated procedure well  There was minimal blood loss, less than 1 cc  There were no complications  Wound care and postoperative instructions were discussed and written instructions also given          Kiera Ahn PA-C

## 2023-06-05 PROCEDURE — 88304 TISSUE EXAM BY PATHOLOGIST: CPT | Performed by: PATHOLOGY

## 2023-07-14 ENCOUNTER — TELEPHONE (OUTPATIENT)
Dept: PSYCHIATRY | Facility: CLINIC | Age: 23
End: 2023-07-14

## 2023-07-14 NOTE — TELEPHONE ENCOUNTER
Patient has been added to the Talk Therapy wait list without a referral.    Insurance: united healthcare  Insurance Type:    Commercial [x]   Medicaid []   [place] (if applicable)   Medicare []  Location Preference: [place]  Provider Preference: pref fem prov  Virtual: Yes [] No [x]

## 2023-08-28 ENCOUNTER — TELEPHONE (OUTPATIENT)
Dept: PSYCHIATRY | Facility: CLINIC | Age: 23
End: 2023-08-28

## 2023-08-28 NOTE — TELEPHONE ENCOUNTER
Patient has been added to the Medication Management and Talk Therapy wait list without a referral.    Insurance: united healthcare  Insurance Type:    Commercial [x]   Medicaid []   [place] (if applicable)   Medicare []  Location Preference[place]  Provider Preference: female  Virtual: Yes [x] No []

## 2023-11-22 ENCOUNTER — OFFICE VISIT (OUTPATIENT)
Dept: FAMILY MEDICINE CLINIC | Facility: CLINIC | Age: 23
End: 2023-11-22
Payer: COMMERCIAL

## 2023-11-22 VITALS
RESPIRATION RATE: 18 BRPM | HEIGHT: 69 IN | WEIGHT: 160 LBS | SYSTOLIC BLOOD PRESSURE: 124 MMHG | BODY MASS INDEX: 23.7 KG/M2 | DIASTOLIC BLOOD PRESSURE: 72 MMHG | HEART RATE: 68 BPM | TEMPERATURE: 97.8 F

## 2023-11-22 DIAGNOSIS — Z13.6 SCREENING FOR CARDIOVASCULAR CONDITION: ICD-10-CM

## 2023-11-22 DIAGNOSIS — Z13.29 SCREENING FOR THYROID DISORDER: ICD-10-CM

## 2023-11-22 DIAGNOSIS — F41.1 GAD (GENERALIZED ANXIETY DISORDER): ICD-10-CM

## 2023-11-22 DIAGNOSIS — Z23 ENCOUNTER FOR IMMUNIZATION: ICD-10-CM

## 2023-11-22 DIAGNOSIS — Z83.49 FAMILY HISTORY OF THYROID DISEASE: ICD-10-CM

## 2023-11-22 DIAGNOSIS — Z00.00 ROUTINE ADULT HEALTH MAINTENANCE: Primary | ICD-10-CM

## 2023-11-22 DIAGNOSIS — Z13.0 SCREENING FOR DEFICIENCY ANEMIA: ICD-10-CM

## 2023-11-22 DIAGNOSIS — Z13.1 SCREENING FOR DIABETES MELLITUS: ICD-10-CM

## 2023-11-22 PROCEDURE — 90471 IMMUNIZATION ADMIN: CPT

## 2023-11-22 PROCEDURE — 99395 PREV VISIT EST AGE 18-39: CPT | Performed by: NURSE PRACTITIONER

## 2023-11-22 PROCEDURE — 90686 IIV4 VACC NO PRSV 0.5 ML IM: CPT

## 2023-11-22 RX ORDER — BUSPIRONE HYDROCHLORIDE 7.5 MG/1
7.5 TABLET ORAL 2 TIMES DAILY
COMMUNITY

## 2023-11-22 NOTE — PROGRESS NOTES
FAMILY [facility] HEALTH MAINTENANCE OFFICE VISIT  [facility]    NAME: Solomon Woodson  AGE: 26 y.o. SEX: male  : 2000     DATE: 2023    Assessment and Plan     1. Routine adult health maintenance    2. Encounter for immunization  -     influenza vaccine, quadrivalent, 0.5 mL, preservative-free, for adult and pediatric patients 6 mos+ (AFLURIA, FLUARIX, FLULAVAL, FLUZONE)    3. Screening for diabetes mellitus  -     Comprehensive metabolic panel; Future  -     Comprehensive metabolic panel    4. Screening for cardiovascular condition  -     Lipid Panel with Direct LDL reflex; Future  -     Lipid Panel with Direct LDL reflex    5. Screening for deficiency anemia  -     CBC; Future  -     CBC    6. Screening for thyroid disorder  -     TSH, 3rd generation with Free T4 reflex; Future  -     TSH, 3rd generation with Free T4 reflex    7. Family history of thyroid disease  -     TSH, 3rd generation with Free T4 reflex; Future  -     TSH, 3rd generation with Free T4 reflex    8. DARON (generalized anxiety disorder)  Assessment & Plan: On buspar and managed by psychatrist          Patient Counseling:   Nutrition: Stressed importance of a well balanced diet, moderation of sodium/saturated fat, caloric balance and sufficient intake of fiber  Exercise: Stressed the importance of regular exercise with a goal of 150 minutes per week  Dental Health: Discussed daily flossing and brushing and regular dental visits   Sexuality: Discussed sexually transmitted infections, use of condoms and prevention of unintended pregnancy  Alcohol Use:  Recommended moderation of alcohol intake  Injury Prevention: Discussed Safety Belts, Safety Helmets, and Smoke Detectors    Immunizations reviewed: See Orders  Discussed benefits of:  Screening labs. BMI Counseling: Body mass index is 23.63 kg/m². Discussed with patient's BMI with him.      Return in about 1 year (around 2024) for Annual physical.        Chief Complaint     Chief Complaint   Patient presents with   • Physical Exam     HK CMA        History of Present Illness     HPI    Well Adult Physical   Patient here for a comprehensive physical exam.  Denies any concerns and complains      Diet and Physical Activity  Diet: well balanced diet  Exercise: frequently      Depression Screen  PHQ-2/9 Depression Screening    Little interest or pleasure in doing things: 0 - not at all  Feeling down, depressed, or hopeless: 0 - not at all  PHQ-2 Score: 0  PHQ-2 Interpretation: Negative depression screen          General Health  Hearing: Normal:  bilateral  Vision: no vision problems  Dental: regular dental visits    Reproductive Health  Denies nocturia and Monthly self testicular exams recommended      The following portions of the patient's history were reviewed and updated as appropriate: allergies, current medications, past family history, past medical history, past social history, past surgical history and problem list.    Review of Systems     Review of Systems   Constitutional: Negative. HENT: Negative. Eyes: Negative. Respiratory: Negative. Cardiovascular: Negative. Gastrointestinal: Negative. Endocrine: Negative. Genitourinary: Negative. Musculoskeletal: Negative. Skin: Negative. Allergic/Immunologic: Negative. Neurological: Negative. Hematological: Negative. Psychiatric/Behavioral: Negative.          Past Medical History     Past Medical History:   Diagnosis Date   • Acne    • Asthma    • Asthma 8/12/2013   • Difficulty walking     last assessed 6/30/2014   • Migraine     last assessed 11/20/2013       Past Surgical History     Past Surgical History:   Procedure Laterality Date   • CIRCUMCISION     • WISDOM TOOTH EXTRACTION         Social History     Social History     Socioeconomic History   • Marital status: Single     Spouse name: None   • Number of children: None   • Years of education: 10   • Highest education level: None   Occupational History   • None   Tobacco Use   • Smoking status: Never   • Smokeless tobacco: Never   Vaping Use   • Vaping Use: Never used   Substance and Sexual Activity   • Alcohol use: Yes   • Drug use: No   • Sexual activity: Yes     Partners: Female   Other Topics Concern   • None   Social History Narrative    Immunization status: up to date and documented. 12th grade     Pets in caregiver's home    Hx of sports activities:  Field hockey    Track and field cross country     Social Determinants of Health     Financial Resource Strain: Not on [employer] Insecurity: Not on file   Transportation Needs: Not on file   Physical Activity: Not on file   Stress: Not on file   Social Connections: Not on file   Intimate Partner Violence: Not on file   Housing Stability: Not on file       Family History     Family History   Problem Relation Age of Onset   • Interstitial cystitis Mother         chronic   • Other Mother         kidney stones   • Hypothyroidism Mother    • Migraines Mother    • Psoriasis Mother    • Other Father         calculus of kidney and ureter   • Atrial fibrillation Maternal Grandmother    • Hypertension Maternal Grandmother    • Other Paternal Grandfather         calculus of kidney and ureter   • Cancer Paternal Grandfather         prostate cancer       Current Medications       Current Outpatient Medications:   •  busPIRone (BUSPAR) 7.5 mg tablet, Take 7.5 mg by mouth 2 (two) times a day, Disp: , Rfl:      Allergies     No Known Allergies    Objective     /72   Pulse 68   Temp 97.8 °F (36.6 °C)   Resp 18   Ht 5' 9" (1.753 m)   Wt 72.6 kg (160 lb)   BMI 23.63 kg/m²      Physical Exam  Vitals reviewed. Constitutional:       Appearance: Normal appearance. He is well-developed. HENT:      Head: Normocephalic.       Right Ear: Tympanic membrane, ear canal and external ear normal.      Left Ear: Tympanic membrane, ear canal and external ear normal.      Nose: Nose normal.      Right Sinus: No maxillary sinus tenderness or frontal sinus tenderness. Left Sinus: No maxillary sinus tenderness or frontal sinus tenderness. Mouth/Throat:      Lips: Pink. Mouth: Mucous membranes are moist.      Dentition: Normal dentition. No gingival swelling. Pharynx: No pharyngeal swelling, oropharyngeal exudate or posterior oropharyngeal erythema. Eyes:      General: Lids are normal.      Conjunctiva/sclera: Conjunctivae normal.      Pupils: Pupils are equal, round, and reactive to light. Neck:      Thyroid: No thyromegaly. Vascular: No carotid bruit. Cardiovascular:      Rate and Rhythm: Normal rate and regular rhythm. Pulses:           Radial pulses are 2+ on the right side and 2+ on the left side. Femoral pulses are 2+ on the right side and 2+ on the left side. Dorsalis pedis pulses are 2+ on the right side and 2+ on the left side. Posterior tibial pulses are 2+ on the right side and 2+ on the left side. Heart sounds: Normal heart sounds. No murmur heard. Pulmonary:      Effort: Pulmonary effort is normal.      Breath sounds: Normal breath sounds. Chest:      Chest wall: No mass, lacerations, deformity, swelling, tenderness, crepitus or edema. There is no dullness to percussion. Breasts:     Right: No swelling, mass, nipple discharge, skin change or tenderness. Left: No swelling, mass, nipple discharge, skin change or tenderness. Abdominal:      General: Bowel sounds are normal.      Palpations: Abdomen is soft. There is no hepatomegaly. Tenderness: There is no abdominal tenderness. There is no rebound. Hernia: No hernia is present. There is no hernia in the umbilical area, ventral area, left inguinal area or right inguinal area. Musculoskeletal:         General: No tenderness or deformity. Normal range of motion.       Cervical back: Full passive range of motion without pain, normal range of motion and neck supple. Lymphadenopathy:      Cervical:      Right cervical: No superficial or posterior cervical adenopathy. Left cervical: No superficial or posterior cervical adenopathy. Upper Body:      Right upper body: No supraclavicular or axillary adenopathy. Left upper body: No supraclavicular or axillary adenopathy. Lower Body: No right inguinal adenopathy. No left inguinal adenopathy. Skin:     General: Skin is warm and dry. Neurological:      Mental Status: He is alert and oriented to person, place, and time. GCS: GCS eye subscore is 4. GCS verbal subscore is 5. GCS motor subscore is 6. Sensory: No sensory deficit. Coordination: Coordination normal.      Gait: Gait normal.      Deep Tendon Reflexes: Reflexes are normal and symmetric. Psychiatric:         Speech: Speech normal.         Behavior: Behavior normal.         Thought Content:  Thought content normal.         Judgment: Judgment normal.           Vision Screening    Right eye Left eye Both eyes   Without correction 20/20 20/20 20/20   With correction              Allison Boothe, [address]

## 2023-11-22 NOTE — PATIENT INSTRUCTIONS
Wellness Visit for Adults   AMBULATORY CARE:   A wellness visit  is when you see your healthcare provider to get screened for health problems. Your healthcare provider will also give you advice on how to stay healthy. Write down your questions so you remember to ask them. Ask your healthcare provider how often you should have a wellness visit. What happens at a wellness visit:  Your healthcare provider will ask about your health, and your family history of health problems. This includes high blood pressure, heart disease, and cancer. He or she will ask if you have symptoms that concern you, if you smoke, and about your mood. You may also be asked about your intake of medicines, supplements, food, and alcohol. Any of the following may be done: Your weight  will be checked. Your height may also be checked so your body mass index (BMI) can be calculated. Your BMI shows if you are at a healthy weight. Your blood pressure  and heart rate will be checked. Your temperature may also be checked. Blood and urine tests  may be done. Blood tests may be done to check your cholesterol levels. Abnormal cholesterol levels increase your risk for heart disease and stroke. You may also need a blood or urine test to check for diabetes if you are at increased risk. Urine tests may be done to look for signs of an infection or kidney disease. A physical exam  includes checking your heartbeat and lungs with a stethoscope. Your healthcare provider may also check your skin to look for sun damage. Screening tests  may be recommended. A screening test is done to check for diseases that may not cause symptoms. The screening tests you may need depend on your age, gender, family history, and lifestyle habits. For example, colorectal screening may be recommended if you are 48years old or older. Screening tests you need if you are a woman:   A Pap smear  is used to screen for cervical cancer.  Pap smears are usually done every 3 to 5 years depending on your age. You may need them more often if you have had abnormal Pap smear test results in the past. Ask your healthcare provider how often you should have a Pap smear. A mammogram  is an x-ray of your breasts to screen for breast cancer. Experts recommend mammograms every 2 years starting at age 48 years. You may need a mammogram at age 52 years or younger if you have an increased risk for breast cancer. Talk to your healthcare provider about when you should start having mammograms and how often you need them. Vaccines you may need:   Get an influenza vaccine  every year. The influenza vaccine protects you from the flu. Several types of viruses cause the flu. The viruses change over time, so new vaccines are made each year. Get a tetanus-diphtheria (Td) booster vaccine  every 10 years. This vaccine protects you against tetanus and diphtheria. Tetanus is a severe infection that may cause painful muscle spasms and lockjaw. Diphtheria is a severe bacterial infection that causes a thick covering in the back of your mouth and throat. Get a human papillomavirus (HPV) vaccine  if you are female and aged 23 to 32 or male 23 to 24 and never received it. This vaccine protects you from HPV infection. HPV is the most common infection spread by sexual contact. HPV may also cause vaginal, penile, and anal cancers. Get a pneumococcal vaccine  if you are aged 72 years or older. The pneumococcal vaccine is an injection given to protect you from pneumococcal disease. Pneumococcal disease is an infection caused by pneumococcal bacteria. The infection may cause pneumonia, meningitis, or an ear infection. Get a shingles vaccine  if you are 60 or older, even if you have had shingles before. The shingles vaccine is an injection to protect you from the varicella-zoster virus. This is the same virus that causes chickenpox.  Shingles is a painful rash that develops in people who had chickenpox or have been exposed to the virus. How to eat healthy:  My Plate is a model for planning healthy meals. It shows the types and amounts of foods that should go on your plate. Fruits and vegetables make up about half of your plate, and grains and protein make up the other half. A serving of dairy is included on the side of your plate. The amount of calories and serving sizes you need depends on your age, gender, weight, and height. Examples of healthy foods are listed below:  Eat a variety of vegetables  such as dark green, red, and orange vegetables. You can also include canned vegetables low in sodium (salt) and frozen vegetables without added butter or sauces. Eat a variety of fresh fruits , canned fruit in 100% juice, frozen fruit, and dried fruit. Include whole grains. At least half of the grains you eat should be whole grains. Examples include whole-wheat bread, wheat pasta, brown rice, and whole-grain cereals such as oatmeal.    Eat a variety of protein foods such as seafood (fish and shellfish), lean meat, and poultry without skin (turkey and chicken). Examples of lean meats include pork leg, shoulder, or tenderloin, and beef round, sirloin, tenderloin, and extra lean ground beef. Other protein foods include eggs and egg substitutes, beans, peas, soy products, nuts, and seeds. Choose low-fat dairy products such as skim or 1% milk or low-fat yogurt, cheese, and cottage cheese. Limit unhealthy fats  such as butter, hard margarine, and shortening. Exercise:  Exercise at least 30 minutes per day on most days of the week. Some examples of exercise include walking, biking, dancing, and swimming. You can also fit in more physical activity by taking the stairs instead of the elevator or parking farther away from stores. Include muscle strengthening activities 2 days each week. Regular exercise provides many health benefits.  It helps you manage your weight, and decreases your risk for type 2 diabetes, heart disease, stroke, and high blood pressure. Exercise can also help improve your mood. Ask your healthcare provider about the best exercise plan for you. General health and safety guidelines:   Do not smoke. Nicotine and other chemicals in cigarettes and cigars can cause lung damage. Ask your healthcare provider for information if you currently smoke and need help to quit. E-cigarettes or smokeless tobacco still contain nicotine. Talk to your healthcare provider before you use these products. Limit alcohol. A drink of alcohol is 12 ounces of beer, 5 ounces of wine, or 1½ ounces of liquor. Lose weight, if needed. Being overweight increases your risk of certain health conditions. These include heart disease, high blood pressure, type 2 diabetes, and certain types of cancer. Protect your skin. Do not sunbathe or use tanning beds. Use sunscreen with a SPF 15 or higher. Apply sunscreen at least 15 minutes before you go outside. Reapply sunscreen every 2 hours. Wear protective clothing, hats, and sunglasses when you are outside. Drive safely. Always wear your seatbelt. Make sure everyone in your car wears a seatbelt. A seatbelt can save your life if you are in an accident. Do not use your cell phone when you are driving. This could distract you and cause an accident. Pull over if you need to make a call or send a text message. Practice safe sex. Use latex condoms if are sexually active and have more than one partner. Your healthcare provider may recommend screening tests for sexually transmitted infections (STIs). Wear helmets, lifejackets, and protective gear. Always wear a helmet when you ride a bike or motorcycle, go skiing, or play sports that could cause a head injury. Wear protective equipment when you play sports. Wear a lifejacket when you are on a boat or doing water sports.     © Copyright [employer] 2023 Information is for End User's use only and may not be sold, redistributed or otherwise used for commercial purposes. The above information is an  only. It is not intended as medical advice for individual conditions or treatments. Talk to your doctor, nurse or pharmacist before following any medical regimen to see if it is safe and effective for you.

## 2023-12-12 ENCOUNTER — TELEPHONE (OUTPATIENT)
Dept: PSYCHIATRY | Facility: CLINIC | Age: 23
End: 2023-12-12

## 2023-12-12 NOTE — TELEPHONE ENCOUNTER
Behavioral Health Outpatient Intake Questions    Referred By   : Self Referral     Please advise interviewee that they need to answer all questions truthfully to allow for best care, and any misrepresentations of information may affect their ability to be seen at this clinic   => Was this discussed? Yes     If Minor Child (under age 25)    Who is/are the legal guardian(s) of the child? Is there a custody agreement? No     If "YES"- Custody orders must be obtained prior to scheduling the first appointment  In addition, Consent to Treatment must be signed by all legal guardians prior to scheduling the first appointment    If "NO"- Consent to Treatment must be signed by all legal guardians prior to scheduling the first appointment      [address] History -     Presenting Problem (in patient's own words):     Pt stated that he has had a therapist in the past and struggles with anxiety and depression. Pt stated that he is looking for a therapist that specializes in [facility]. Are there any communication barriers for this patient? Yes                                               If yes, please describe barriers: ADHD  If there is a unique situation, please refer to [address] for final determination. PT stated it is "manageable"     Are you taking any psychiatric medications? Yes     If "YES" -What are they Buspar     If "YES" -Who prescribes? Third Party     Has the Patient previously received outpatient Talk Therapy or Medication Management from [facility]  No        If "YES"- When, Where and with Whom? If "NO" -Has Patient received these services elsewhere? If "YES" -When, Where, and with Whom? Has the Patient abused alcohol or other substances in the last 6 months ? No  No concerns of substance abuse are reported. If "YES" -What substance, How much, How often? If illegal substance: Refer to [employer] (for LUNA) or [facility].    If Alcohol in excess of 10 drinks per week:  Refer to [facility] (for LUNA) or [facility] History-     Is this treatment court ordered? No   If "yes "send to : Talk Therapy : Send to [facility] for final determination   Med Management: Send to Dr Casper Severe for final determination     Has the Patient been convicted of a felony? No   If "Yes" send to -When, What?  or final determination   Med Management: Send to Dr Casper Severe for final determination     ACCEPTED as a patient Yes  If "Yes" Appointment Date: 1/11/2024    Referred Elsewhere? No  If “Yes” - (Where? Ex: [facility], [facility], [address] [place] [address], etc.)       Name of [address] [place] [address] GX#[id number]  Insurance Phone #  If ins is primary or secondary? Primary   If patient is a minor, parents information such as Name, D. O.B of guarantor.

## 2024-01-11 ENCOUNTER — TELEPHONE (OUTPATIENT)
Dept: PSYCHIATRY | Facility: CLINIC | Age: 24
End: 2024-01-11

## 2024-01-11 ENCOUNTER — TELEMEDICINE (OUTPATIENT)
Dept: PSYCHIATRY | Facility: CLINIC | Age: 24
End: 2024-01-11
Payer: COMMERCIAL

## 2024-01-11 DIAGNOSIS — F41.1 GAD (GENERALIZED ANXIETY DISORDER): Primary | ICD-10-CM

## 2024-01-11 PROCEDURE — 90791 PSYCH DIAGNOSTIC EVALUATION: CPT

## 2024-01-11 NOTE — BH TREATMENT PLAN
Outpatient Behavioral Health Psychotherapy Treatment Plan    Solomon Alexanderco  2000     Date of Initial Psychotherapy Assessment: 1/11/2024   Date of Current Treatment Plan: 01/11/24  Treatment Plan Target Date: 1/11/2025  Treatment Plan Expiration Date: 7/11/2024    Diagnosis:   1. DARON (generalized anxiety disorder)            Area(s) of Need: Visual tourettes, relationship with girlfriend, family conflicts    Long Term Goal 1 (in the client's own words): I want to have minimal to no tourettes.    Stage of Change: Contemplation    Target Date for completion: 1/11/2025     Anticipated therapeutic modalities: ERP, client centered, CBT and supportive psychotherapy     People identified to complete this goal: Therapist, prescriber, Solomon      Objective 1: (identify the means of measuring success in meeting the objective): Therapist will engage Solomon in exposure and response prevention for symptoms. Solomon will give feedback on effectiveness of treatment and therapist will adjust treatment for maximum effectiveness of treatment.  Solomon will see a reduction in symptoms to experience symptoms in less than 2 out of 10 situations      Objective 2: (identify the means of measuring success in meeting the objective): Therapist will engage Solomon in supportive and client centered psychotherapy to discuss and improve relationships with girlfriend and family members.  Soolmon will experience improved relationships at least 8 out of 10 interactions.           I am currently under the care of a . [facility] psychiatric provider: no    My . [facility] psychiatric provider is:     I am currently taking psychiatric medications: Yes, as prescribed    I feel that I will be ready for discharge from mental health care when I reach the following (measurable goal/objective): When I have minimal symptoms and improved relationships    For children and adults who have a legal guardian:   Has there been any change to custody orders and/or guardianship  status? NA. If yes, attach updated documentation.    I have created my Crisis Plan and have been offered a copy of this plan    Behavioral Health Treatment Plan [facility]: Diagnosis and Treatment Plan explained to Solomon Woodson acknowledges an understanding of their diagnosis. Solomon Woodson agrees to this treatment plan.    I have been offered a copy of this Treatment Plan. Yes  Solomon Woodson, 2000, actively participated in the creation of this treatment plan during a virtual session, using the [employer] platform.   Solomon Woodson  provided verbal consent on 1/11/2024 at 0745 AM. The treatment plan was transcribed into the Electronic Health Record at a later time.
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
## 2024-01-18 ENCOUNTER — OFFICE VISIT (OUTPATIENT)
Dept: URGENT CARE | Facility: MEDICAL CENTER | Age: 24
End: 2024-01-18
Payer: COMMERCIAL

## 2024-01-18 VITALS
HEIGHT: 69 IN | OXYGEN SATURATION: 95 % | WEIGHT: 165 LBS | DIASTOLIC BLOOD PRESSURE: 66 MMHG | HEART RATE: 79 BPM | BODY MASS INDEX: 24.44 KG/M2 | RESPIRATION RATE: 18 BRPM | SYSTOLIC BLOOD PRESSURE: 134 MMHG | TEMPERATURE: 98.5 F

## 2024-01-18 DIAGNOSIS — Z20.2 EXPOSURE TO CHLAMYDIA: Primary | ICD-10-CM

## 2024-01-18 PROCEDURE — 87491 CHLMYD TRACH DNA AMP PROBE: CPT | Performed by: PHYSICIAN ASSISTANT

## 2024-01-18 PROCEDURE — 99213 OFFICE O/P EST LOW 20 MIN: CPT | Performed by: PHYSICIAN ASSISTANT

## 2024-01-18 PROCEDURE — 87591 N.GONORRHOEAE DNA AMP PROB: CPT | Performed by: PHYSICIAN ASSISTANT

## 2024-01-18 RX ORDER — CEFTRIAXONE SODIUM 250 MG/1
500 INJECTION, POWDER, FOR SOLUTION INTRAMUSCULAR; INTRAVENOUS ONCE
Status: COMPLETED | OUTPATIENT
Start: 2024-01-18 | End: 2024-01-18

## 2024-01-18 RX ORDER — LIDOCAINE HYDROCHLORIDE 10 MG/ML
2.1 INJECTION, SOLUTION EPIDURAL; INFILTRATION; INTRACAUDAL; PERINEURAL ONCE
Status: COMPLETED | OUTPATIENT
Start: 2024-01-18 | End: 2024-01-18

## 2024-01-18 RX ORDER — DOXYCYCLINE 100 MG/1
100 TABLET ORAL 2 TIMES DAILY
Qty: 14 TABLET | Refills: 0 | Status: SHIPPED | OUTPATIENT
Start: 2024-01-18 | End: 2024-01-25

## 2024-01-18 RX ADMIN — LIDOCAINE HYDROCHLORIDE 2.1 ML: 10 INJECTION, SOLUTION EPIDURAL; INFILTRATION; INTRACAUDAL; PERINEURAL at 15:16

## 2024-01-18 RX ADMIN — CEFTRIAXONE SODIUM 500 MG: 250 INJECTION, POWDER, FOR SOLUTION INTRAMUSCULAR; INTRAVENOUS at 15:16

## 2024-01-18 NOTE — PROGRESS NOTES
[facility]        NAME: Solomon Woodson is a 23 y.o. male  : 2000    MRN: [id number]  DATE: 2024  TIME: 2:50 PM    Assessment and Plan   Exposure to chlamydia [Z20.2]  1. Exposure to chlamydia  cefTRIAXone (ROCEPHIN) injection 500 mg    doxycycline (ADOXA) 100 MG tablet    Chlamydia/GC amplified DNA by PCR            Patient Instructions     You received an IM injection of 500mg of ceftriaxone in the office today.  Rx sent for doxycycline 100mg tablet, take 1 tablet by mouth 2x per day for 1 week (14 tablets).  Urine was sent for chlamydia/gonorrhea testing. Will call wit results when available.  If you do not hear from us by Saturday morning, give us a call back.  Follow up with PCP in 3-5 days.  Proceed to  ER if symptoms worsen.    Chief Complaint     Chief Complaint   Patient presents with    Exposure to STD     Girlfriend stated she is positive for chlamydia.  He is here to be tested.         History of Present Illness       Patient is a 22 yo male who presents with known exposure to chlamydia. He reports that his girlfriend was having symptoms and got tested, which came back positive for chlamydia. He reports he is not having any symptoms. Denies fevers. Denies penile discharge/pain. No urinary symptoms.        Review of Systems   Review of Systems   Constitutional: Negative.    Respiratory: Negative.     Cardiovascular: Negative.    Genitourinary: Negative.         + known exposure to chlamydia   Skin: Negative.    Neurological: Negative.          Current Medications       Current Outpatient Medications:     busPIRone (BUSPAR) 7.5 mg tablet, Take 7.5 mg by mouth 2 (two) times a day, Disp: , Rfl:     doxycycline (ADOXA) 100 MG tablet, Take 1 tablet (100 mg total) by mouth 2 (two) times a day for 7 days, Disp: 14 tablet, Rfl: 0    Current Facility-Administered Medications:     cefTRIAXone (ROCEPHIN) injection 500 mg, 500 mg, Intramuscular, Once, Jason Leong,  "HUGO    Current Allergies     Allergies as of 01/18/2024    (No Known Allergies)            The following portions of the patient's history were reviewed and updated as appropriate: allergies, current medications, past family history, past medical history, past social history, past surgical history and problem list.     Past Medical History:   Diagnosis Date    Acne     Asthma     Asthma 8/12/2013    Difficulty walking     last assessed 6/30/2014    Migraine     last assessed 11/20/2013       Past Surgical History:   Procedure Laterality Date    CIRCUMCISION      WISDOM TOOTH EXTRACTION         Family History   Problem Relation Age of Onset    Interstitial cystitis Mother         chronic    Other Mother         kidney stones    Hypothyroidism Mother     Migraines Mother     Psoriasis Mother     Other Father         calculus of kidney and ureter    Atrial fibrillation Maternal Grandmother     Hypertension Maternal Grandmother     Other Paternal Grandfather         calculus of kidney and ureter    Cancer Paternal Grandfather         prostate cancer         Medications have been verified.        Objective   /66   Pulse 79   Temp 98.5 °F (36.9 °C) (Temporal)   Resp 18   Ht 5' 9\" (1.753 m)   Wt 74.8 kg (165 lb)   SpO2 95%   BMI 24.37 kg/m²        Physical Exam     Physical Exam  Constitutional:       Appearance: Normal appearance.   HENT:      Mouth/Throat:      Mouth: Mucous membranes are moist.      Pharynx: Oropharynx is clear.   Eyes:      Extraocular Movements: Extraocular movements intact.      Conjunctiva/sclera: Conjunctivae normal.      Pupils: Pupils are equal, round, and reactive to light.   Cardiovascular:      Rate and Rhythm: Normal rate and regular rhythm.      Pulses: Normal pulses.      Heart sounds: Normal heart sounds.   Pulmonary:      Effort: Pulmonary effort is normal.      Breath sounds: Normal breath sounds.   Genitourinary:     Penis: Normal.       Testes: Normal.   Skin:     " General: Skin is warm and dry.      Capillary Refill: Capillary refill takes less than 2 seconds.   Neurological:      Mental Status: He is alert.

## 2024-01-18 NOTE — PATIENT INSTRUCTIONS
You received an injection of 500mg of ceftriaxone in the office today.  Rx sent for doxycycline 100mg tablet, take 1 tablet by mouth 2x per day for 1 week (14 tablets).  Urine was sent for chlamydia/gonorrhea testing. Will call wit results when available.  If you do not hear from us by Saturday morning, give us a call back.

## 2024-01-19 ENCOUNTER — TELEPHONE (OUTPATIENT)
Dept: URGENT CARE | Facility: MEDICAL CENTER | Age: 24
End: 2024-01-19

## 2024-01-19 LAB
C TRACH DNA SPEC QL NAA+PROBE: POSITIVE
N GONORRHOEA DNA SPEC QL NAA+PROBE: NEGATIVE

## 2024-01-19 NOTE — TELEPHONE ENCOUNTER
Pt called, notified pt that urine for chlamydia/gc amplified dna by pcr came back positive for chlamydia. All questions answered at this time.

## 2024-04-30 ENCOUNTER — TELEPHONE (OUTPATIENT)
Age: 24
End: 2024-04-30

## 2024-05-01 ENCOUNTER — TELEPHONE (OUTPATIENT)
Dept: FAMILY MEDICINE CLINIC | Facility: CLINIC | Age: 24
End: 2024-05-01

## 2024-05-01 ENCOUNTER — CLINICAL SUPPORT (OUTPATIENT)
Dept: FAMILY MEDICINE CLINIC | Facility: CLINIC | Age: 24
End: 2024-05-01
Payer: COMMERCIAL

## 2024-05-01 DIAGNOSIS — Z11.1 PPD SCREENING TEST: Primary | ICD-10-CM

## 2024-05-01 PROCEDURE — 86580 TB INTRADERMAL TEST: CPT

## 2024-05-01 NOTE — TELEPHONE ENCOUNTER
Form given to fatemeh to sign for when patient comes back Friday to have ppd read    Cathy Simpson CMA

## 2024-05-03 ENCOUNTER — CLINICAL SUPPORT (OUTPATIENT)
Dept: FAMILY MEDICINE CLINIC | Facility: CLINIC | Age: 24
End: 2024-05-03

## 2024-05-03 DIAGNOSIS — Z11.1 SCREENING-PULMONARY TB: Primary | ICD-10-CM

## 2024-05-03 LAB
INDURATION: 0 MM
TB SKIN TEST: NEGATIVE

## 2024-05-03 NOTE — TELEPHONE ENCOUNTER
Can office re fax physical . Last page with PCP signiture is missing. Thank you   Fax# [phone]

## 2024-05-06 ENCOUNTER — TELEPHONE (OUTPATIENT)
Age: 24
End: 2024-05-06

## 2024-05-06 NOTE — TELEPHONE ENCOUNTER
Isabella from [facility] was calling in to get TB results, stated they got the pt latest physical but also needs those TB results. Isabella would like this fax over to [phone], and a call back number with any questions or concerns is [phone] thank you.

## 2024-05-14 ENCOUNTER — TELEPHONE (OUTPATIENT)
Dept: PSYCHIATRY | Facility: CLINIC | Age: 24
End: 2024-05-14

## 2024-05-14 NOTE — TELEPHONE ENCOUNTER
Patient contacted the office to schedule a follow up visit with provider. Patient is now scheduled for 5/28/24  at 10 am  virtually.

## 2024-05-20 ENCOUNTER — TELEPHONE (OUTPATIENT)
Dept: PSYCHIATRY | Facility: CLINIC | Age: 24
End: 2024-05-20

## 2024-05-20 NOTE — TELEPHONE ENCOUNTER
Patient called in regard to wait list status and had a billing question. Writer confirmed patient is on wait list. Writer informed patient of the questionnaire in [employer] that was sent 5/17/24 for wait list and ins information. Patient understood. Writer transferred call to billing per patient's request.

## 2024-05-29 ENCOUNTER — TELEPHONE (OUTPATIENT)
Dept: PSYCHIATRY | Facility: CLINIC | Age: 24
End: 2024-05-29

## 2024-05-29 NOTE — TELEPHONE ENCOUNTER
NO-SHOW LETTER MAILED TO Solomon Woodson ON 5/29/2024.    ADDRESS: [address] [place] [address]  [place] [phone]

## 2024-06-27 ENCOUNTER — TELEMEDICINE (OUTPATIENT)
Dept: PSYCHIATRY | Facility: CLINIC | Age: 24
End: 2024-06-27

## 2024-06-27 DIAGNOSIS — F41.1 GAD (GENERALIZED ANXIETY DISORDER): Primary | ICD-10-CM

## 2024-06-27 NOTE — PSYCH
No Call. No Show. No Charge    Solomon Woodson no showed 06/27/24 appointment , staff called and left message to reschedule appointment     Treatment Plan not completed within required time limits due to: Solomon Aniyaonaco no show appointment on 06/27/24

## 2024-06-28 ENCOUNTER — TELEPHONE (OUTPATIENT)
Dept: PSYCHIATRY | Facility: CLINIC | Age: 24
End: 2024-06-28

## 2024-06-28 NOTE — TELEPHONE ENCOUNTER
NO-SHOW LETTER MAILED TO Solomon Woodson ON 6/28/2024.    ADDRESS: [address] [place] [address]  [place] [phone]

## 2024-06-28 NOTE — TELEPHONE ENCOUNTER
Solomon Woodson called and  requested a call back to discuss why he never received the link for his appt..    They can be reached at P# [phone].       Thank you.

## 2024-07-22 ENCOUNTER — TELEPHONE (OUTPATIENT)
Age: 24
End: 2024-07-22

## 2024-07-22 NOTE — TELEPHONE ENCOUNTER
Outreach call made to inquire if client is still interested in med management services, currently on wait list. LVM to call back; included contact phone number and advised to press option #3 for intake.     Full intake not needed, client is established (TT).

## 2024-07-25 ENCOUNTER — TELEPHONE (OUTPATIENT)
Dept: PSYCHIATRY | Facility: CLINIC | Age: 24
End: 2024-07-25

## 2024-07-25 NOTE — TELEPHONE ENCOUNTER
The patient contacted the office returning a call regarding a potential med mgmt appt. The patient is now scheduled for 8/23/24 at 9 am with Mimi ahmadi.

## 2024-07-25 NOTE — TELEPHONE ENCOUNTER
NO-SHOW LETTER MAILED TO Solomon Woodson ON 7/25/2024.    ADDRESS: [address] [place] [address]  [place] [phone]

## 2024-08-01 ENCOUNTER — TELEPHONE (OUTPATIENT)
Dept: PSYCHIATRY | Facility: CLINIC | Age: 24
End: 2024-08-01

## 2024-08-01 NOTE — TELEPHONE ENCOUNTER
One week follow up call for New Patient appointment with   Mimi Wyman DO   on 08/23/2024 was made on 08/01/2024. Writer informed patient of New Patient paperwork needing to be completed 5 days prior to the appointment. Writer confirmed paperwork has been sent via [employer].    Appointment was made on: 07/25/2024

## 2024-08-23 ENCOUNTER — TELEPHONE (OUTPATIENT)
Age: 24
End: 2024-08-23

## 2024-08-23 ENCOUNTER — TELEMEDICINE (OUTPATIENT)
Dept: PSYCHIATRY | Facility: CLINIC | Age: 24
End: 2024-08-23
Payer: COMMERCIAL

## 2024-08-23 DIAGNOSIS — F90.9 ATTENTION DEFICIT HYPERACTIVITY DISORDER (ADHD), UNSPECIFIED ADHD TYPE: Primary | ICD-10-CM

## 2024-08-23 PROCEDURE — 90792 PSYCH DIAG EVAL W/MED SRVCS: CPT | Performed by: STUDENT IN AN ORGANIZED HEALTH CARE EDUCATION/TRAINING PROGRAM

## 2024-08-23 NOTE — TELEPHONE ENCOUNTER
Patient called stating he has not received the link to connect for his np appt.  tried to contact the patients provider via teams with no response.  called the office elizabeth explained and was instructed to have the patient fix his my chart in order to connect. Mr stated she will have the provider send the link via email this time.  provided the my chart help line for the patient.

## 2024-08-23 NOTE — H&P
PSYCHIATRIC EVALUATION     [facility]    Name and Date of Birth:  Solomon Woodson 23 y.o. 2000 MRN: [id number]    Date of Visit: August 23, 2024    Reason for visit: Full psychiatric intake assessment for medication management     HPI     Solomon Woodson is a 23 y.o. male with a past psychiatric history significant for DRAON, OCD, ADHD who presents to the [facility] outpatient clinic for intake assessment. Solomon presents as a new patient for this physician.    The patient, Solomon, presents with a history of untreated Attention Deficit Hyperactivity Disorder (ADHD), diagnosed multiple times during his childhood. He reports that he was able to manage his ADHD during high school and college, but has struggled since entering the workforce. He was let go from his first job after six weeks due to performance issues, which he attributes to his ADHD. 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    Solomon also reports experiencing social anxiety and general anxiety, for which he is currently seeing a therapist. He mentions a recent therapy session that significantly reduced his anxiety levels, but he is unsure if this improvement will persist once he returns to a stressful work environment.    In terms of his social life, Solomon reports that it is quite limited. Many of his friends have moved away and he does not keep in touch with them frequently. He does, however, see his girlfriend and family regularly. 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    Solomon also mentions a past diagnosis of peripheral Obsessive-Compulsive Disorder (OCD) during college, characterized by a need for order and routine. However, he believes he has outgrown these symptoms and they are no longer a concern.    In terms of  medication history, Solomon reports having been prescribed Buspar 7.5 mg twice a day for his anxiety. He stopped taking this medication a month ago and has not noticed any negative effects since discontinuing it.  He believes that he does not need the medication anymore.    With regards to his history, he denies any history of SI, HI, AVH, delusions, neela but states that he was once evaluated for possible Asperger's syndrome.  He denies any psychiatric hospitalization.  He first sought mental health support at the age of [age] or [age] due to hyperactivity and behavioral issues, and again in college due to OCD. He continues to see his therapist regularly.    After discussion of risks, benefits, potential side effects, alternatives, patient will complete neuropsychological testing to evaluate for ADHD.  Pending results, we will initiate ADHD medication conversation.  He denies any acute mental complaints or concerns at this time.        Current Rating Scores:     Current PHQ-9   PHQ-2/9 Depression Screening    Little interest or pleasure in doing things: 0 - not at all  Feeling down, depressed, or hopeless: 0 - not at all       Current DARON-7 is   DARON-7 Flowsheet Screening      Flowsheet Row Most Recent Value   Over the last two weeks, how often have you been bothered by the following problems?     Feeling nervous, anxious, or on edge 1   Not being able to stop or control worrying 1   Worrying too much about different things 1   Trouble relaxing  1   Being so restless that it's hard to sit still 2   Becoming easily annoyed or irritable  2   Feeling afraid as if something awful might happen 0   How difficult have these problems made it for you to do your work, take care of things at home, or get along with other people?  Somewhat difficult   DARON Score  8        .    Psychiatric Review Of Systems:    Sleep changes: no  Appetite changes: no  Weight changes: no  Energy/anergy: no  Interest/pleasure/anhedonia: no  Somatic  symptoms: no  Anxiety/panic: worrying  Elham: no  Guilty/hopeless: no  Self injurious behavior/risky behavior: no  Suicidal ideation: no  Homicidal ideation: no  Auditory hallucinations: no  Visual hallucinations: no  Other hallucinations: no  Delusional thinking: no  Eating disorder history: unknown  Obsessive/compulsive symptoms: history of obsessions, history of compulsive behavior    Review Of Systems:    Constitutional negative   ENT negative   Cardiovascular negative   Respiratory negative   Gastrointestinal negative   Genitourinary negative   Musculoskeletal negative   Integumentary negative   Neurological negative   Endocrine negative   Other Symptoms none, all other systems are negative       Family Psychiatric History:     Family History   Problem Relation Age of Onset    Interstitial cystitis Mother         chronic    Other Mother         kidney stones    Hypothyroidism Mother     Migraines Mother     Psoriasis Mother     Other Father         calculus of kidney and ureter    Atrial fibrillation Maternal Grandmother     Hypertension Maternal Grandmother     Other Paternal Grandfather         calculus of kidney and ureter    Cancer Paternal Grandfather         prostate cancer         Past Psychiatric History:     Inpatient psychiatric admissions: Denies  Prior outpatient psychiatric linkage: Patient cannot remember the name of past practice  Past/current psychotherapy: [facility] therapist  History of suicidal attempts/gestures: Denies  History of violence/aggressive behaviors: Denies  Psychotropic medication trials: BuSpar  Substance abuse inpatient/outpatient rehabilitation: Denies    Substance Abuse History:    No history of ETOH, illict substance, or tobacco abuse. No past legal actions or arrests secondary to substance intoxication. The patient denies prior DWIs/DUIs. No history of outpatient/inpatient rehabilitation programs. Solomon does not exhibit objective evidence of substance withdrawal during  today's examination nor does Solomon appear under the influence of any psychoactive substance.      Patient does state that he uses alcohol but only socially and denies any history of withdrawal symptoms, blackouts, seizures.      Social History:    Developmental: Denies a history of milestone/developmental delay. Denies a history of in-utero exposure to toxins/illicit substances. There is no documented history of IEP or need for special education.  Education: college graduate  Marital history: Has girlfriend  Living arrangement, social support: parents  Occupational History: unemployed  Access to firearms: Denies direct access to weapons/firearms. Solomon Woodson has no history of arrests or violence with a deadly weapon.     Traumatic History:     Abuse:none is reported  Other Traumatic Events:  None reported    Past Medical History:    Past Medical History:   Diagnosis Date    Acne     Asthma     Asthma 8/12/2013    Difficulty walking     last assessed 6/30/2014    Migraine     last assessed 11/20/2013        Past Surgical History:   Procedure Laterality Date    CIRCUMCISION      WISDOM TOOTH EXTRACTION       No Known Allergies    History Review:    The following portions of the patient's history were reviewed and updated as appropriate: allergies, current medications, past family history, past medical history, past social history, past surgical history, and problem list.    OBJECTIVE:    Vital signs in last 24 hours:    There were no vitals filed for this visit.    Mental Status Evaluation:    Appearance age appropriate, casually dressed   Behavior cooperative, calm   Speech normal rate, normal volume, normal pitch   Mood euthymic   Affect normal range and intensity, appropriate   Thought Processes organized, goal directed   Associations intact associations   Thought Content no overt delusions   Perceptual Disturbances: no auditory hallucinations, no visual hallucinations   Abnormal Thoughts  Risk Potential  Suicidal ideation - None at present  Homicidal ideation - None at present  Potential for aggression - Not at present   Orientation oriented to person, place, time/date, and situation   Memory recent and remote memory grossly intact   Consciousness alert and awake   Attention Span Concentration Span attention span and concentration appear shorter than expected for age   Intellect appears to be of average intelligence   Insight intact   Judgement intact   Muscle Strength and  Gait unable to assess today due to virtual visit   Motor Activity unable to assess today due to virtual visit   Language no difficulty naming common objects, no difficulty repeating a phrase   Fund of Knowledge adequate knowledge of current events  adequate fund of knowledge regarding past history  adequate fund of knowledge regarding vocabulary    Pain none   Pain Scale 0       Laboratory Results: I have personally reviewed all pertinent laboratory/tests results    Recent Labs (last 2 months):   No visits with results within 2 Month(s) from this visit.   Latest known visit with results is:   Clinical Support on 05/01/2024   Component Date Value    TB Skin Test 05/03/2024 Negative     Induration 05/03/2024 0.0        Suicide/Homicide Risk Assessment:    Risk of Harm to Self:  The following ratings are based on assessment at the time of the interview  Historical Risk Factors include: chronic psychiatric problems  Protective Factors: no current suicidal ideation, access to mental health treatment, compliant with mental health treatment, having a desire to be alive, stable living environment, strong relationships    Risk of Harm to Others:  The following ratings are based on assessment at the time of the interview  Historical Risk Factors include: none.  Protective Factors: no current homicidal ideation    The following interventions are recommended: contracts for safety at present - agrees to go to ED if feeling unsafe, contracts for safety at  present - agrees to call Crisis Intervention Service if feeling unsafe. Although patient's acute lethality risk is LOW, long-term/chronic lethality risk is mildly elevated given historical mental health diagoses. However, at the current moment, Solomon is future-oriented, forward-thinking, and demonstrates ability to act in a self-preserving manner as evidenced by volitionally presenting to the appointment today, seeking treatment. Additionally, Solomon's responses suggest a will and desire to live. At this juncture, inpatient hospitalization is not currently warranted. To mitigate future risk, patient should adhere to treatment recommendations, avoid alcohol/illicit substance use, utilize community-based resources and familiar support, and prioritize mental health treatment.     DSM-V Diagnoses:     1.)  ADHD, unspecified  2.)   3.)     Assessment/Plan:     Patient will obtain neuropsychological testing to evaluate for extent of ADHD and we will trial medications thereafter pending results.  He denies any acute mental complaints or concerns at this time and declines any other psychotropics because he states that he does not feel particularly depressed or anxious.  He will continue to meet regularly with his therapist.      Today's Plan/Medical Decision Making:    Psychopharmacologically, I spoke at length with Solomon about the bio-psycho-social approach to treatment and avenues for intervention. I stressed the importance of making better dietary choices, expanding exercise regimen, and reestablishing a sense of purpose and connectivity in life. I also emphasized the importance of establishing care with the primary care physician along with specialists relevant to their medical diagnoses to which the patient voiced understanding and agreement.        Treatment Recommendations/Precautions:        Aware of 24 hour and weekend coverage for urgent situations accessed by calling [facility] main practice  number    Patient voiced understanding and agreement to call 911 or head to the nearest emergency room should they experience any physical decompensation whatsoever including but not limited to the red flag signs and symptoms of fevers, chills, chest pains, nausea, vomiting, dizziness, changes in vision, trouble breathing.  Patient was also offered the contact information of their local [facility] crisis hotline and voiced understanding and agreement to call it or [phone] or head to nearest emergency department immediately should they experience any mental health decompensation whatsoever including but not limited to SI, HI, increasing AVH, neela.     Medications Risks/Benefits:      Risks, Benefits And Possible Side Effects Of Medications:    Risks, benefits, and possible side effects of medications explained to Solomon and he verbalizes understanding and agreement for treatment.    Controlled Medication Discussion:     Not applicable    Treatment Plan:    Completed and signed during the session: Not applicable - Treatment Plan to be completed by [facility] therapist      Visit Time    Visit Start Time: 9:00 AM  Visit Stop Time: 9:45 AM  Total Visit Duration:  45 minutes     The total visit duration detailed above includes: patient engagement, medication management, psychotherapy/counseling, discussion regarding treatment goals, documentation, review of past medical records, and coordination of care.      Note Share Disclaimer:     This note was not shared with the patient due to reasonable likelihood of causing patient harm    Mimi Wyman DO  08/23/24

## 2024-09-25 NOTE — PROGRESS NOTES
Chief Complaint  15yr [facility]-no hearing      History of Present Illness  HM, 12-18 years, Male [address] [place] [address] [address]: The patient comes in today for routine health maintenance with his mother  The last health maintenance visit was 2 years ago  General health since the last visit is described as good  Dental care includes good dental hygiene and regular dental visits  Immunizations are needed  No sensory or development concerns are expressed  Current diet includes a normal healthy diet  Dietary supplements:  daily multivitamins and Fish oil  No nutritional concerns are expressed  No elimination concerns are expressed  He sleeps for 7 hours at night  He sleeps alone in a bed  His temperament is described as happy  Household risk factors:  exposure to pets, but no passive smoking exposure  Safety elements used:  seat belt, safety helmet, sun safety, smoke detectors and carbon monoxide detectors  He is in grade 8  School performance has been excellent  Review of Systems    Constitutional: no fever  Eyes: no itching of the eyes and no purulent discharge from the eyes  ENT: no nasal discharge, no earache and no sore throat  Respiratory: no cough  Gastrointestinal: no vomiting and no diarrhea  Neurological: no headache  Active Problems    1  Acute pharyngitis (462) (J02 9)   2  Acute upper respiratory infection (465 9) (J06 9)   3  Allergic dermatitis (692 9) (L23 9)   4  Asthma (493 90) (J45 909)   5  Fever (780 60) (R50 9)   6  Hypertriglyceridemia (272 1) (E78 1)   7  Pectus excavatum (754 81) (Q67 6)   8  Pes planus, congenital (754 61) (Q66 50)   9   Tarsal coalition (755 67) (H17 71)    Past Medical History    · History of Acute conjunctivitis (372 00) (H10 30)   · History of Contact dermatitis due to plant (692 6) (L25 5)   · History of Cough (786 2) (R05)   · History of Difficulty walking (719 7) (R26 2)   · History of migraine (V12 49) (Z86 69)   · History of Leg pain (729 5) (M79 606)   · History of Migraine headache (346 90) (G43 909)   · History of Viral infection (079 99) (B34 9)    Family History    · Family history of Chronic Interstitial Cystitis   · Family history of Hypothyroidism   · Family history of Migraine Headache   · Family history of Psoriasis    · Family history of Calculus Of Kidney And Ureter    · Family history of Atrial Fibrillation   · Family history of Hypertension (V17 49)    · Family history of Calculus Of Kidney And Ureter    Social History    · Activities: Ice hockey   · Currently in 8th grade   · History of Educational Level - In Grade 6   · Pets in caregiver's home   · History of Sports Activities [employer]   · Sports Activities Soccer    Current Meds   1  Ibuprofen TABS; Therapy: (Recorded:30Jun2014) to Recorded   2  ProAir  (90 Base) MCG/ACT Inhalation Aerosol Solution; INHALE 2 PUFFS   EVERY 4-6 HOURS AS NEEDED  Requested for: [id number]; Last Rx:30Oct2014   Ordered    Allergies    1  No Known Drug Allergies    Vitals   Recorded: 20Apr2016 04:41PM   Temperature 98 2 F   Heart Rate 84   Systolic 999   Diastolic 76   Height 5 ft 7 5 in   2-20 Stature Percentile 69 %   Weight 152 lb    2-20 Weight Percentile 89 %   BMI Calculated 23 46   BMI Percentile 87 %   BSA Calculated 1 81     Physical Exam    Constitutional - General Appearance: well appearing with no visible distress; no dysmorphic features  Head and Face - Head and face: Normocephalic atraumatic  Eyes - Conjunctiva and lids: Conjunctiva noninjected, no eye discharge and no swelling  Pupils and irises: Equal, round, reactive to light and accommodation bilaterally; Extraocular muscles intact; Sclera anicteric  Ophthalmoscopic examination normal    Ears, Nose, Mouth, and Throat - Nasal mucosa, septum, and turbinates:  no nasal discharge  The right nasal mucosa was excoriated  The left nasal mucosa was not excoriated  External inspection of ears and nose: Normal without deformities or discharge;  No pinna or tragal tenderness  Otoscopic examination: Tympanic membrane is pearly gray and nonbulging without discharge  Lips, teeth, and gums: Normal, good dentition  Oropharynx: Oropharynx without ulcer, exudate or erythema, moist mucous membranes  Neck - Neck: Supple  Thyroid: No thyromegaly  Pulmonary - Respiratory effort: Normal respiratory rate and rhythm, no stridor, no tachypnea, grunting, flaring or retractions  Auscultation of lungs: Clear to auscultation bilaterally without wheeze, rales, or rhonchi  Cardiovascular - Auscultation of heart: Regular rate and rhythm, no murmur  Femoral pulses: Normal, 2+ bilaterally  Chest - Chest:  Chest: pectus excavatum  Breasts: Normal    Abdomen - Abdomen: Normal bowel sounds, soft, nondistended, nontender, no organomegaly  Genitourinary - Scrotal contents: Normal; testes descended bilaterally, no hydrocele  Penis: Normal, no lesions  Ra 5  Lymphatic - Palpation of lymph nodes in neck: No anterior or posterior cervical lymphadenopathy  Palpation of lymph nodes in groin: No lymphadenopathy  Musculoskeletal - Gait and station: Normal gait  Evaluation for scoliosis: No scoliosis on exam  Full range of motion in all extremities  Stability: No joint instability  Muscle strength/tone: No hypertonia or hypotonia  Skin - Skin and subcutaneous tissue: No rash , no bruising, no pallor, cyanosis, or icterus  Neurologic - Reflexes:  Deep tendon reflexes: 2+ right biceps, 2+ left biceps, 2+ right patella and 2+ left patella  Cranial nerves: Cranial nerves II-XII intact  Procedure    Procedure: Visual Acuity Test    Indication: routine screening  Inforrmation supplied by a Snellen chart  Results: 20/25 in both eyes without corrective device, 20/25 in the right eye without corrective device, 20/25 in the left eye without corrective device normal in both eyes  The patient tolerated the procedure well  There were no complications  Assessment    1   Well child visit (V20 2) (Z00 129)    Plan  Health Maintenance    · (1) CBC/PLT/DIFF; Status:Active; Requested for:20Apr2016;    Perform:[facility]; Due:20Apr2017;Ordered;  For:Health Maintenance; Ordered By:Jamari Thao;   · (1) COMPREHENSIVE METABOLIC PANEL; Status:Active; Requested for:20Apr2016;    Perform:[facility]; Due:20Apr2017;Ordered;  For:Health Maintenance; Ordered By:Jamari Thao;   · (1) LIPID PANEL, FASTING; Status:Active; Requested for:20Apr2016;    Perform:[facility]; Due:20Apr2017;Ordered;  For:Health Maintenance; Ordered By:Jamari Thao;   · SNELLEN VISION- POC; Status:Complete;   Done: [id number]   Perform: In Office; Due:23Apr2016;Ordered; Today;  For:Health Maintenance; Ordered By:Jamari Thao;   · Gardasil 9 Intramuscular Suspension; 0 5 ml IM; To Be Done: 20Apr2016   For: Health Maintenance; Ordered By:Jamari Thao; Effective Date:20Apr2016    Discussion/Summary    Impression:   No growth, development, elimination, feeding and sleep concerns  Anticipatory guidance addressed as per the history of present illness section  Vaccinations to be administered include human papilloma  Information discussed with mother  Return in 4 months for HPV vaccination  He is doing well  Follow up yearly  The pectus has not changed  Use Vaseline in the right nostril bid x 3-5 days  The treatment plan was reviewed with the patient/guardian  The patient/guardian understands and agrees with the treatment plan   The patient's family was counseled regarding instructions for management, patient and family education        Signatures   Electronically signed by : BRISA Rocha ; Apr 20 2016  5:23PM EST                       (Author) abdominal pain

## 2024-09-26 ENCOUNTER — TELEPHONE (OUTPATIENT)
Age: 24
End: 2024-09-26

## 2024-09-26 NOTE — TELEPHONE ENCOUNTER
Patient called in regard to being on psychology (neuro) services wait list and would like to know when he can be scheduled. Writer will forward the request to coordinator.

## 2024-10-07 ENCOUNTER — TELEPHONE (OUTPATIENT)
Dept: PSYCHIATRY | Facility: CLINIC | Age: 24
End: 2024-10-07

## 2024-10-07 NOTE — TELEPHONE ENCOUNTER
INTEREST LETTER MAILED TO Solomon Woodson ON 10/9/2024.    ADDRESS: [address] [place] [address]  Lynnette GUZMAN [phone]

## 2024-10-28 ENCOUNTER — TELEPHONE (OUTPATIENT)
Dept: PSYCHIATRY | Facility: CLINIC | Age: 24
End: 2024-10-28

## 2024-10-28 ENCOUNTER — DOCUMENTATION (OUTPATIENT)
Dept: PSYCHIATRY | Facility: CLINIC | Age: 24
End: 2024-10-28

## 2024-10-28 DIAGNOSIS — F41.1 GAD (GENERALIZED ANXIETY DISORDER): Primary | ICD-10-CM

## 2024-10-28 NOTE — TELEPHONE ENCOUNTER
DISCHARGE LETTER for MARIANA CARO (certified) placed in outgoing mail on 10/30/2024.    Article #:  [id number]    Address:  [address] [place] [address]  [place] GU[place] [phone]

## 2024-10-28 NOTE — PROGRESS NOTES
Psychotherapy Discharge Summary    Preferred Name: Solomon Woodson  YOB: 2000    Admission date to psychotherapy: 1/11/2024    Referred by: self    Presenting Problem: anxiety    Course of treatment included : individual therapy     Progress/Outcome of Treatment Goals (brief summary of course of treatment) Treatment goals set for anxiety     Treatment Complications (if any): multiple no shows    Treatment Progress:  unable to assess    Current [facility] Provider: none    Discharge Medications include: Buspar    Discharge Date: 10/28/2024    Discharge Diagnosis:   1. DARON (generalized anxiety disorder)            Criteria for Discharge: two or more unexcused absences for services    Aftercare recommendations include (include specific referral names and phone numbers, if appropriate): mental health individual therapy as needed    Prognosis:  unable to assess

## 2024-11-02 ENCOUNTER — TELEPHONE (OUTPATIENT)
Dept: OTHER | Facility: OTHER | Age: 24
End: 2024-11-02

## 2024-11-02 NOTE — TELEPHONE ENCOUNTER
"Pt stated, \" I would like to get my canceled appointment rescheduled.    Please follow up, thank you.   "

## 2024-11-11 ENCOUNTER — TELEPHONE (OUTPATIENT)
Dept: PSYCHIATRY | Facility: CLINIC | Age: 24
End: 2024-11-11

## 2024-11-11 NOTE — TELEPHONE ENCOUNTER
Also sent client several visit messages that in order to have virtual appointment today all forms must be signed or the appointment will be canceled.

## 2024-12-05 ENCOUNTER — OFFICE VISIT (OUTPATIENT)
Dept: FAMILY MEDICINE CLINIC | Facility: CLINIC | Age: 24
End: 2024-12-05
Payer: COMMERCIAL

## 2024-12-05 VITALS
OXYGEN SATURATION: 96 % | HEART RATE: 80 BPM | SYSTOLIC BLOOD PRESSURE: 128 MMHG | TEMPERATURE: 98 F | DIASTOLIC BLOOD PRESSURE: 74 MMHG | RESPIRATION RATE: 20 BRPM | HEIGHT: 69 IN | BODY MASS INDEX: 25.77 KG/M2 | WEIGHT: 174 LBS

## 2024-12-05 DIAGNOSIS — Z86.59 HISTORY OF ADHD: ICD-10-CM

## 2024-12-05 DIAGNOSIS — R53.83 FATIGUE, UNSPECIFIED TYPE: ICD-10-CM

## 2024-12-05 DIAGNOSIS — F41.1 GAD (GENERALIZED ANXIETY DISORDER): ICD-10-CM

## 2024-12-05 DIAGNOSIS — Z00.00 ANNUAL PHYSICAL EXAM: Primary | ICD-10-CM

## 2024-12-05 DIAGNOSIS — Z13.6 SCREENING FOR CARDIOVASCULAR CONDITION: ICD-10-CM

## 2024-12-05 DIAGNOSIS — Z83.49 FAMILY HISTORY OF THYROID DISORDER: ICD-10-CM

## 2024-12-05 DIAGNOSIS — Z23 ENCOUNTER FOR IMMUNIZATION: ICD-10-CM

## 2024-12-05 PROCEDURE — 99395 PREV VISIT EST AGE 18-39: CPT

## 2024-12-05 PROCEDURE — 99214 OFFICE O/P EST MOD 30 MIN: CPT

## 2024-12-05 PROCEDURE — 90656 IIV3 VACC NO PRSV 0.5 ML IM: CPT

## 2024-12-05 PROCEDURE — 90471 IMMUNIZATION ADMIN: CPT

## 2024-12-05 NOTE — ASSESSMENT & PLAN NOTE
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  Recommended patient reach out to psychiatrist    Orders:    Ambulatory Referral to Neuropsychology; Future

## 2024-12-05 NOTE — PROGRESS NOTES
Adult Annual Physical  Name: Solomon Woodson      : 2000      MRN: [id number]  Encounter Provider: Ava White DO  Encounter Date: 2024   Encounter department: [facility]    Assessment & Plan  Annual physical exam  Continue balanced diet and moderate intensity exercise weekly  Complete labs  Follow-up in 1 year for annual physical or sooner if needed       Encounter for immunization    Orders:    influenza vaccine preservative-free 0.5 mL IM (Fluzone, Afluria, Fluarix, Flulaval)    DARON (generalized anxiety disorder)  Follows with psychiatry at [facility] was previously on BuSpar 7.5 twice a day, currently off medication  Patient does report a little 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  Recommended patient reach out to psychiatrist    Orders:    Ambulatory Referral to Neuropsychology; Future    History of ADHD  Reports that he was diagnosed with ADHD when he was around 6 in his psychiatrist sent him for formal testing with neuropsych.  Referral to neuropsych placed again today.  Orders:    Ambulatory Referral to Neuropsychology; Future    Family history of thyroid disorder  Mother with hypothyroidism  Orders:    TSH, 3rd generation with Free T4 reflex; Future    Fatigue, unspecified type  Complains of fatigue and does report unrefreshing sleep even though he sleeps about 7 to 9 hours.  Currently setting for exams which has been slightly stressful.  Will check basic labs including TSH  Orders:    TSH, 3rd generation with Free T4 reflex; Future    Screening for cardiovascular condition    Orders:    CBC; Future    Basic metabolic panel; Future    Immunizations and preventive care screenings were discussed with patient today. Appropriate education was printed on patient's after visit summary.    Counseling:  Alcohol/drug use: discussed moderation in alcohol intake, the recommendations for healthy alcohol use, and avoidance of illicit drug  use.  Dental Health: discussed importance of regular tooth brushing, flossing, and dental visits.  Injury prevention: discussed safety/seat belts, safety helmets, smoke detectors, carbon monoxide detectors, and smoking near bedding or upholstery.  Sexual health: discussed sexually transmitted diseases, partner selection, use of condoms, avoidance of unintended pregnancy, and contraceptive alternatives.  Exercise: the importance of regular exercise/physical activity was discussed. Recommend exercise 3-5 times per week for at least 30 minutes.          History of Present Illness     Adult Annual Physical:  Patient presents for annual physical. PMH of anxiety and was on buspar 7.5 mg but currently off medication. Does follow with a psychiatrist for anxiety but was recommended formal neuropsych eval for ADHD.    Reports being constantly fatigued even if doing nothing. Sleeping well - 7-9 hours of sleep but does not feel fully rested. Does snore. Would like basic labs.    Currently taking sabbatical studying for finance exams. .     Diet and Physical Activity:  - Diet/Nutrition: limited junk food, consuming 3-5 servings of fruits/vegetables daily, adequate whole grain intake and well balanced diet.  - Exercise: moderate cardiovascular exercise, 30-60 minutes on average and 3-4 times a week on average.    Depression Screening:  - PHQ-2 Score: 0    General Health:  - Sleep: sleeps well and 7-8 hours of sleep on average.  - Hearing: normal hearing bilateral ears.  - Vision: no vision problems.  - Dental: brushes teeth twice daily and regular dental visits.     Health:  - History of STDs: yes.   - Urinary symptoms: none.     Review of Systems   Constitutional:  Negative for chills and fever.   HENT:  Negative for congestion and rhinorrhea.    Eyes:  Negative for visual disturbance.   Respiratory:  Negative for cough and shortness of breath.    Cardiovascular:  Negative for chest pain and palpitations.   Gastrointestinal:   "Negative for abdominal pain, constipation, diarrhea, nausea and vomiting.   Genitourinary:  Negative for dysuria.   Musculoskeletal:  Negative for arthralgias.   Skin:  Negative for rash.   Neurological:  Negative for dizziness, light-headedness and headaches.     Current Outpatient Medications on File Prior to Visit   Medication Sig Dispense Refill    [DISCONTINUED] busPIRone (BUSPAR) 7.5 mg tablet Take 7.5 mg by mouth 2 (two) times a day       No current facility-administered medications on file prior to visit.        Objective   /74 (BP Location: Left arm, Patient Position: Sitting, Cuff Size: Large)   Pulse 80   Temp 98 °F (36.7 °C)   Resp 20   Ht 5' 9\" (1.753 m)   Wt 78.9 kg (174 lb)   SpO2 96%   BMI 25.70 kg/m²     Physical Exam  Vitals reviewed.   Constitutional:       General: He is not in acute distress.     Appearance: Normal appearance.   HENT:      Head: Normocephalic and atraumatic.      Right Ear: External ear normal.      Left Ear: External ear normal.      Nose: Nose normal.      Mouth/Throat:      Pharynx: Oropharynx is clear.   Eyes:      Conjunctiva/sclera: Conjunctivae normal.   Cardiovascular:      Rate and Rhythm: Normal rate and regular rhythm.      Pulses: Normal pulses.      Heart sounds: Normal heart sounds.   Pulmonary:      Effort: Pulmonary effort is normal.      Breath sounds: Normal breath sounds.   Abdominal:      Palpations: Abdomen is soft.   Musculoskeletal:      Right lower leg: No edema.      Left lower leg: No edema.   Skin:     General: Skin is warm.   Neurological:      Mental Status: He is alert and oriented to person, place, and time.   Psychiatric:         Mood and Affect: Mood normal.         Behavior: Behavior normal.         "

## 2024-12-05 NOTE — PATIENT INSTRUCTIONS
"Patient Education     Routine physical for adults   The Basics   Written by the doctors and editors at [facility]   What is a physical? -- A physical is a routine visit, or \"check-up,\" with your doctor. You might also hear it called a \"wellness visit\" or \"preventive visit.\"  During each visit, the doctor will:   Ask about your physical and mental health   Ask about your habits, behaviors, and lifestyle   Do an exam   Give you vaccines if needed   Talk to you about any medicines you take   Give advice about your health   Answer your questions  Getting regular check-ups is an important part of taking care of your health. It can help your doctor find and treat any problems you have. But it's also important for preventing health problems.  A routine physical is different from a \"sick visit.\" A sick visit is when you see a doctor because of a health concern or problem. Since physicals are scheduled ahead of time, you can think about what you want to ask the doctor.  How often should I get a physical? -- It depends on your age and health. In general, for people age 21 years and older:   If you are younger than 50 years, you might be able to get a physical every 3 years.   If you are 50 years or older, your doctor might recommend a physical every year.  If you have an ongoing health condition, like diabetes or high blood pressure, your doctor will probably want to see you more often.  What happens during a physical? -- In general, each visit will include:   Physical exam - The doctor or nurse will check your height, weight, heart rate, and blood pressure. They will also look at your eyes and ears. They will ask about how you are feeling and whether you have any symptoms that bother you.   Medicines - It's a good idea to bring a list of all the medicines you take to each doctor visit. Your doctor will talk to you about your medicines and answer any questions. Tell them if you are having any side effects that bother you. You " "should also tell them if you are having trouble paying for any of your medicines.   Habits and behaviors - This includes:   Your diet   Your exercise habits   Whether you smoke, drink alcohol, or use drugs   Whether you are sexually active   Whether you feel safe at home  Your doctor will talk to you about things you can do to improve your health and lower your risk of health problems. They will also offer help and support. For example, if you want to quit smoking, they can give you advice and might prescribe medicines. If you want to improve your diet or get more physical activity, they can help you with this, too.   Lab tests, if needed - The tests you get will depend on your age and situation. For example, your doctor might want to check your:   Cholesterol   Blood sugar   Iron level   Vaccines - The recommended vaccines will depend on your age, health, and what vaccines you already had. Vaccines are very important because they can prevent certain serious or deadly infections.   Discussion of screening - \"Screening\" means checking for diseases or other health problems before they cause symptoms. Your doctor can recommend screening based on your age, risk, and preferences. This might include tests to check for:   Cancer, such as breast, prostate, cervical, ovarian, colorectal, prostate, lung, or skin cancer   Sexually transmitted infections, such as chlamydia and gonorrhea   Mental health conditions like depression and anxiety  Your doctor will talk to you about the different types of screening tests. They can help you decide which screenings to have. They can also explain what the results might mean.   Answering questions - The physical is a good time to ask the doctor or nurse questions about your health. If needed, they can refer you to other doctors or specialists, too.  Adults older than 65 years often need other care, too. As you get older, your doctor will talk to you about:   How to prevent falling at " home   Hearing or vision tests   Memory testing   How to take your medicines safely   Making sure that you have the help and support you need at home  All topics are updated as new evidence becomes available and our peer review process is complete.  This topic retrieved from [employer] on: May 02, 2024.  Topic 131650 Version 1.0  Release: 32.4.3 - C32.122  © 2024 UpToDate, Inc. and/or its affiliates. All rights reserved.  Consumer Information Use and Disclaimer   Disclaimer: This generalized information is a limited summary of diagnosis, treatment, and/or medication information. It is not meant to be comprehensive and should be used as a tool to help the user understand and/or assess potential diagnostic and treatment options. It does NOT include all information about conditions, treatments, medications, side effects, or risks that may apply to a specific patient. It is not intended to be medical advice or a substitute for the medical advice, diagnosis, or treatment of a health care provider based on the health care provider's examination and assessment of a patient's specific and unique circumstances. Patients must speak with a health care provider for complete information about their health, medical questions, and treatment options, including any risks or benefits regarding use of medications. This information does not endorse any treatments or medications as safe, effective, or approved for treating a specific patient. UpToDate, Inc. and its affiliates disclaim any warranty or liability relating to this information or the use thereof.The use of this information is governed by the Terms of Use, available at https://www.woltersGMIuwer.com/en/know/clinical-effectiveness-terms. 2024© UpToDate, Inc. and its affiliates and/or licensors. All rights reserved.  Copyright   © 2024 UpToDate, Inc. and/or its affiliates. All rights reserved.

## 2025-03-19 ENCOUNTER — TELEPHONE (OUTPATIENT)
Dept: FAMILY MEDICINE CLINIC | Facility: CLINIC | Age: 25
End: 2025-03-19

## 2025-03-28 ENCOUNTER — TELEPHONE (OUTPATIENT)
Dept: PSYCHIATRY | Facility: CLINIC | Age: 25
End: 2025-03-28

## 2025-03-28 NOTE — PROGRESS NOTES
PSYCHIATRIC DISCHARGE SUMMARY    [facility]    Name and Date of Birth:  Solomon Woodson 24 y.o. 2000    Admission Date: See initial encounter    Discharge Date: 3/28/2025     Referral source: Self    Discharge Type: Did not return for follow-up/comply with treatment plan    Discharge Diagnosis:    As per most recent chronological note      Treating Physician: Dr. Mimi Wyman     Treatment Complications: Did not comply with treatment plan, return for follow-up     Admit with discharge: No    Prognosis at time of discharge: Guarded, last reviewed on most recent encounter    Presenting Problems/Pertinent Findings:      As per initial encounter note        Past Psychiatric History:     As per initial encounter note      Traumatic History:     As per initial encounter note    Past Medical History:    Past Medical History:   Diagnosis Date    Acne     Anxiety 08/2023    Asthma     Asthma 08/12/2013    Difficulty walking     last assessed 6/30/2014    Migraine     last assessed 11/20/2013        Past Surgical History:   Procedure Laterality Date    CIRCUMCISION      WISDOM TOOTH EXTRACTION         Allergies:    No Known Allergies    Substance Abuse History:     Social History     Substance and Sexual Activity   Drug Use No     Social History     Substance and Sexual Activity   Alcohol Use Yes    Alcohol/week: 4.0 standard drinks of alcohol    Types: 4 Glasses of wine per week       Family Psychiatric History:     Family History   Problem Relation Age of Onset    Interstitial cystitis Mother         chronic    Hypothyroidism Mother     Migraines Mother     Psoriasis Mother     Anxiety disorder Mother     ADD / ADHD Father     Atrial fibrillation Maternal Grandmother     Hypertension Maternal Grandmother     Cancer Paternal Grandfather         prostate cancer       Social History/Trauma History/Past Psychiatric History:    Social History     Socioeconomic History     Marital status: Single     Spouse name: Not on file    Number of children: Not on file    Years of education: 10    Highest education level: Not on file   Occupational History    Not on file   Tobacco Use    Smoking status: Never    Smokeless tobacco: Never   Vaping Use    Vaping status: Never Used   Substance and Sexual Activity    Alcohol use: Yes     Alcohol/week: 4.0 standard drinks of alcohol     Types: 4 Glasses of wine per week    Drug use: No    Sexual activity: Yes     Partners: Female   Other Topics Concern    Not on file   Social History Narrative    Immunization status: up to date and documented.    12th grade     Pets in caregiver's home    Hx of sports activities:  Field hockey    Track and field cross country     Social Drivers of Health     Financial Resource Strain: Not on file   Food Insecurity: Not on file   Transportation Needs: Not on file   Physical Activity: Not on file   Stress: Not on file   Social Connections: Not on file   Intimate Partner Violence: Unknown (8/17/2024)    Received from [facility]    Intimate Partner Violence     Within the last year, have you been afraid of your partner, ex-partner or family member?: Not on file     Within the last year, have you been humiliated or emotionally abused in other ways by your partner, ex-partner or family member?: Not on file     Within the last year, have you been kicked, hit, slapped, or otherwise physically hurt by your partner, ex-partner or family member?: Not on file     Within the last year, have you been raped or forced to have any kind of sexual activity by your partner, ex-partner or family member?: Not on file   Housing Stability: Not on file         Therapist: No      Course of Treatment: Initial medication evaluation, medication management      Summary of Treatment Progress:     Solomon was seen for initial psychiatric evaluation and medication management.       Lethality Risk at the time of discharge from clinic: LOW.      At the time of the most recent psychiatric assessment, Solomon was future-oriented, forward-thinking, and demonstrated ability to act in a self-preserving manner as evidenced by volitionally presenting to the clinic, seeking treatment. To mitigate future risk, patient should adhere to treatment recommendations, avoid alcohol/illicit substance use, utilize community-based resources and familiar support, and prioritize mental health treatment.       Suicide/Homicide Risk Assessment performed on last encounter. See results on last encounter.         History Review:  Reviewed on most recent encounter      MENTAL STATUS EVALUATION (last reviewed on most recent encounter) :      As per most recent chronological encounter        To what extent did Solomon achieve his goals?:  Some goals achieved      Criteria for Discharge: Did not return for follow-up/adhere to treatment plan      Aftercare Recommendations:     Follow-up with therapist/psychiatrist/family physician willing to prescribe psychotropics      Discharge Medications:   No current outpatient medications on file.     Describe ability and willingness to work and solve mental problems: Struggled at following treatment plan    May have difficulty solving problems    Mimi Wyman DO 03/28/25

## 2025-04-22 ENCOUNTER — TELEPHONE (OUTPATIENT)
Age: 25
End: 2025-04-22

## 2025-04-22 NOTE — TELEPHONE ENCOUNTER
Patient was removed from the psychological testing wait list due to no longer being established with a  provider.